# Patient Record
Sex: MALE | Race: WHITE | Employment: PART TIME | URBAN - METROPOLITAN AREA
[De-identification: names, ages, dates, MRNs, and addresses within clinical notes are randomized per-mention and may not be internally consistent; named-entity substitution may affect disease eponyms.]

---

## 2024-10-14 ENCOUNTER — PREP FOR PROCEDURE (OUTPATIENT)
Dept: GASTROENTEROLOGY | Age: 49
End: 2024-10-14

## 2024-10-14 ENCOUNTER — ANESTHESIA (OUTPATIENT)
Dept: ENDOSCOPY | Age: 49
End: 2024-10-14

## 2024-10-14 ENCOUNTER — ANESTHESIA EVENT (OUTPATIENT)
Dept: ENDOSCOPY | Age: 49
End: 2024-10-14

## 2024-10-14 PROCEDURE — 2500000003 HC RX 250 WO HCPCS

## 2024-10-14 PROCEDURE — 6360000002 HC RX W HCPCS

## 2024-10-14 RX ORDER — SODIUM CHLORIDE 9 MG/ML
INJECTION, SOLUTION INTRAVENOUS CONTINUOUS
Status: CANCELLED | OUTPATIENT
Start: 2024-10-14

## 2024-10-14 RX ORDER — GLYCOPYRROLATE 1 MG/5 ML
SYRINGE (ML) INTRAVENOUS
Status: DISCONTINUED | OUTPATIENT
Start: 2024-10-14 | End: 2024-10-14 | Stop reason: SDUPTHER

## 2024-10-14 RX ORDER — SODIUM CHLORIDE 0.9 % (FLUSH) 0.9 %
5-40 SYRINGE (ML) INJECTION EVERY 12 HOURS SCHEDULED
Status: CANCELLED | OUTPATIENT
Start: 2024-10-14

## 2024-10-14 RX ORDER — ONDANSETRON 2 MG/ML
INJECTION INTRAMUSCULAR; INTRAVENOUS
Status: DISCONTINUED | OUTPATIENT
Start: 2024-10-14 | End: 2024-10-14 | Stop reason: SDUPTHER

## 2024-10-14 RX ORDER — SODIUM CHLORIDE 0.9 % (FLUSH) 0.9 %
5-40 SYRINGE (ML) INJECTION PRN
Status: CANCELLED | OUTPATIENT
Start: 2024-10-14

## 2024-10-14 RX ORDER — ROCURONIUM BROMIDE 10 MG/ML
INJECTION, SOLUTION INTRAVENOUS
Status: DISCONTINUED | OUTPATIENT
Start: 2024-10-14 | End: 2024-10-14 | Stop reason: SDUPTHER

## 2024-10-14 RX ORDER — INDOMETHACIN 100 MG
100 SUPPOSITORY, RECTAL RECTAL ONCE
Status: CANCELLED | OUTPATIENT
Start: 2024-10-14 | End: 2024-10-14

## 2024-10-14 RX ORDER — SODIUM CHLORIDE 9 MG/ML
INJECTION, SOLUTION INTRAVENOUS PRN
Status: CANCELLED | OUTPATIENT
Start: 2024-10-14

## 2024-10-14 RX ORDER — PROPOFOL 10 MG/ML
INJECTION, EMULSION INTRAVENOUS
Status: DISCONTINUED | OUTPATIENT
Start: 2024-10-14 | End: 2024-10-14 | Stop reason: SDUPTHER

## 2024-10-14 RX ADMIN — ONDANSETRON 4 MG: 2 INJECTION INTRAMUSCULAR; INTRAVENOUS at 13:18

## 2024-10-14 RX ADMIN — ROCURONIUM BROMIDE 50 MG: 10 INJECTION, SOLUTION INTRAVENOUS at 13:12

## 2024-10-14 RX ADMIN — PROPOFOL 200 MG: 10 INJECTION, EMULSION INTRAVENOUS at 13:12

## 2024-10-14 RX ADMIN — Medication 0.2 MG: at 13:26

## 2024-10-14 RX ADMIN — SUGAMMADEX 200 MG: 100 INJECTION, SOLUTION INTRAVENOUS at 13:48

## 2024-10-14 NOTE — ANESTHESIA POSTPROCEDURE EVALUATION
Department of Anesthesiology  Postprocedure Note    Patient: Juan Luis Massey  MRN: [id number]  YOB: 1975  Date of evaluation: 10/14/2024    Procedure Summary       Date: 10/14/24 Room / Location: [facility] OR 02 / [facility]    Anesthesia Start: 1309 Anesthesia Stop: 1404    Procedure: ENDOSCOPIC RETROGRADE CHOLANGIOPANCREATOGRAPHY DIAGNOSTIC With Sphincterotomy and BiliaryDilation with Balloon sweep and  Biliary Advanix stent placement Diagnosis:       Choledocholithiasis      (Choledocholithiasis [K80.50])    Surgeons: James Kent MD Responsible Provider: Bashir Hearn APRN - CRNA    Anesthesia Type: General ASA Status: 3            Anesthesia Type: General    Papi Phase I: Papi Score: 10    Papi Phase II:      Anesthesia Post Evaluation    Patient location during evaluation: PACU  Patient participation: waiting for patient participation  Level of consciousness: sleepy but conscious  Pain score: 0  Airway patency: patent  Nausea & Vomiting: no nausea and no vomiting  Cardiovascular status: blood pressure returned to baseline and hemodynamically stable  Respiratory status: acceptable, nonlabored ventilation, spontaneous ventilation and face mask  Hydration status: euvolemic  Pain management: adequate        No notable events documented.

## 2024-10-14 NOTE — ANESTHESIA PRE PROCEDURE
Department of Anesthesiology  Preprocedure Note       Name:  Juan Luis Massey   Age:  49 y.o.  :  1975                                          MRN:  [id number]         Date:  10/14/2024      Surgeon: Surgeon(s):  James Kent MD    Procedure: Procedure(s):  ENDOSCOPIC RETROGRADE CHOLANGIOPANCREATOGRAPHY DIAGNOSTIC    Medications prior to admission:   Prior to Admission medications    Medication Sig Start Date End Date Taking? Authorizing Provider   risperiDONE (RISPERDAL) 2 MG tablet Take 1 tablet by mouth 2 times daily   Yes ProviderCharan MD   vitamin B-12 (CYANOCOBALAMIN) 500 MCG tablet Take 1 tablet by mouth daily   Yes Provider, MD Charan   lisinopril (PRINIVIL;ZESTRIL) 5 MG tablet Take 1 tablet by mouth daily   Yes ProviderCharan MD   buPROPion (WELLBUTRIN XL) 300 MG extended release tablet Take 1 tablet by mouth every morning   Yes Provider, MD Charan   aspirin 81 MG chewable tablet Take 1 tablet by mouth daily   Yes ProviderCharan MD   levothyroxine (SYNTHROID) 75 MCG tablet Take 1 tablet by mouth Daily   Yes Provider, MD Charan   rivaroxaban (XARELTO) 10 MG TABS tablet Take 2.5 mg by mouth 2 times daily   Yes Provider, MD Charan   atorvastatin (LIPITOR) 80 MG tablet Take 1 tablet by mouth daily   Yes ProviderCharan MD   tiZANidine (ZANAFLEX) 2 MG tablet Take 1 tablet by mouth every 6 hours as needed   Yes Provider, MD Charan   vitamin D (VITAMIN D3) 50 MCG (2000 UT) CAPS capsule Take 1 capsule by mouth daily   Yes ProviderCharan MD       Current medications:    Current Facility-Administered Medications   Medication Dose Route Frequency Provider Last Rate Last Admin    sodium chloride 0.9 % infusion             0.9 % sodium chloride infusion   IntraVENous Continuous James Kent MD 75 mL/hr at 10/14/24 1122 New Bag at 10/14/24 1122    sodium chloride flush 0.9 % injection 5-40 mL  5-40 mL IntraVENous 2 times per day James Kent MD

## 2024-12-05 RX ORDER — SODIUM CHLORIDE 0.9 % (FLUSH) 0.9 %
5-40 SYRINGE (ML) INJECTION PRN
Status: CANCELLED | OUTPATIENT
Start: 2024-12-05

## 2024-12-05 RX ORDER — SODIUM CHLORIDE 9 MG/ML
INJECTION, SOLUTION INTRAVENOUS CONTINUOUS
Status: CANCELLED | OUTPATIENT
Start: 2024-12-05

## 2024-12-05 RX ORDER — SODIUM CHLORIDE 9 MG/ML
INJECTION, SOLUTION INTRAVENOUS PRN
Status: CANCELLED | OUTPATIENT
Start: 2024-12-05

## 2024-12-05 RX ORDER — SODIUM CHLORIDE 0.9 % (FLUSH) 0.9 %
5-40 SYRINGE (ML) INJECTION EVERY 12 HOURS SCHEDULED
Status: CANCELLED | OUTPATIENT
Start: 2024-12-05

## 2024-12-05 RX ORDER — INDOMETHACIN 100 MG
100 SUPPOSITORY, RECTAL RECTAL ONCE
Status: CANCELLED | OUTPATIENT
Start: 2024-12-05 | End: 2024-12-05

## 2024-12-13 ENCOUNTER — TELEPHONE (OUTPATIENT)
Dept: GASTROENTEROLOGY | Age: 49
End: 2024-12-13

## 2024-12-13 NOTE — TELEPHONE ENCOUNTER
Spoke with Chelsea regarding Dr. Kent last message, they will call the office back next week to reschedule the procedure date.

## 2024-12-13 NOTE — TELEPHONE ENCOUNTER
The patient guardian called to see if the patient can wait until Feb to remove his stent, he is schedule now on 12/26/24 @10:00.   Chelsea Alexandra said Dr. Comer at [facility] wanted the patient to get a scan of his carotid artery before having his gallbladder out. Is this ok to wait until February. Please advise.

## 2024-12-17 ENCOUNTER — PREP FOR PROCEDURE (OUTPATIENT)
Dept: GASTROENTEROLOGY | Age: 49
End: 2024-12-17

## 2024-12-19 NOTE — TELEPHONE ENCOUNTER
Following up with Chelsea, she said she thought the patient had some blockage from the Carotid Artery test he had done at [facility] . The patient will have additional test done and they will call back if they need to reschedule his procedure on 12/26/24.

## 2024-12-20 NOTE — TELEPHONE ENCOUNTER
The patient guardian called  to reschedule the stent removal to 1/23/25@ 10:30, the patient will have an ultra sound done on the Carotid Artery at [facility] on 12/23/24. The patient Group [facility] was informed at [phone].

## 2024-12-25 RX ORDER — SODIUM CHLORIDE 0.9 % (FLUSH) 0.9 %
5-40 SYRINGE (ML) INJECTION EVERY 12 HOURS SCHEDULED
Status: CANCELLED | OUTPATIENT
Start: 2024-12-25

## 2024-12-25 RX ORDER — SODIUM CHLORIDE 9 MG/ML
INJECTION, SOLUTION INTRAVENOUS PRN
Status: CANCELLED | OUTPATIENT
Start: 2024-12-25

## 2024-12-25 RX ORDER — INDOMETHACIN 100 MG
100 SUPPOSITORY, RECTAL RECTAL ONCE
Status: CANCELLED | OUTPATIENT
Start: 2024-12-25 | End: 2024-12-25

## 2024-12-25 RX ORDER — SODIUM CHLORIDE 9 MG/ML
INJECTION, SOLUTION INTRAVENOUS CONTINUOUS
Status: CANCELLED | OUTPATIENT
Start: 2024-12-25

## 2024-12-25 RX ORDER — SODIUM CHLORIDE 0.9 % (FLUSH) 0.9 %
5-40 SYRINGE (ML) INJECTION PRN
Status: CANCELLED | OUTPATIENT
Start: 2024-12-25

## 2025-01-23 ENCOUNTER — ANESTHESIA (OUTPATIENT)
Dept: ENDOSCOPY | Age: 50
End: 2025-01-23
Payer: MEDICARE

## 2025-01-23 ENCOUNTER — APPOINTMENT (OUTPATIENT)
Dept: GENERAL RADIOLOGY | Age: 50
End: 2025-01-23
Attending: INTERNAL MEDICINE
Payer: MEDICARE

## 2025-01-23 ENCOUNTER — HOSPITAL ENCOUNTER (OUTPATIENT)
Age: 50
Setting detail: OUTPATIENT SURGERY
Discharge: HOME | End: 2025-01-23
Attending: INTERNAL MEDICINE | Admitting: INTERNAL MEDICINE
Payer: MEDICARE

## 2025-01-23 ENCOUNTER — ANESTHESIA EVENT (OUTPATIENT)
Dept: ENDOSCOPY | Age: 50
End: 2025-01-23
Payer: MEDICARE

## 2025-01-23 VITALS
WEIGHT: 250 LBS | OXYGEN SATURATION: 95 % | HEART RATE: 66 BPM | BODY MASS INDEX: 41.65 KG/M2 | TEMPERATURE: 98.1 F | RESPIRATION RATE: 16 BRPM | HEIGHT: 65 IN | DIASTOLIC BLOOD PRESSURE: 81 MMHG | SYSTOLIC BLOOD PRESSURE: 124 MMHG

## 2025-01-23 DIAGNOSIS — R52 PAIN: ICD-10-CM

## 2025-01-23 PROCEDURE — 74328 X-RAY BILE DUCT ENDOSCOPY: CPT | Performed by: INTERNAL MEDICINE

## 2025-01-23 PROCEDURE — 3700000000 HC ANESTHESIA ATTENDED CARE: Performed by: INTERNAL MEDICINE

## 2025-01-23 PROCEDURE — 7100000010 HC PHASE II RECOVERY - FIRST 15 MIN: Performed by: INTERNAL MEDICINE

## 2025-01-23 PROCEDURE — 7100000011 HC PHASE II RECOVERY - ADDTL 15 MIN: Performed by: INTERNAL MEDICINE

## 2025-01-23 PROCEDURE — 2709999900 HC NON-CHARGEABLE SUPPLY: Performed by: INTERNAL MEDICINE

## 2025-01-23 PROCEDURE — 74300 X-RAY BILE DUCTS/PANCREAS: CPT

## 2025-01-23 PROCEDURE — 43276 ERCP STENT EXCHANGE W/DILATE: CPT | Performed by: INTERNAL MEDICINE

## 2025-01-23 PROCEDURE — 6360000004 HC RX CONTRAST MEDICATION: Performed by: INTERNAL MEDICINE

## 2025-01-23 PROCEDURE — C1769 GUIDE WIRE: HCPCS | Performed by: INTERNAL MEDICINE

## 2025-01-23 PROCEDURE — 2720000010 HC SURG SUPPLY STERILE: Performed by: INTERNAL MEDICINE

## 2025-01-23 PROCEDURE — 6360000002 HC RX W HCPCS: Performed by: NURSE ANESTHETIST, CERTIFIED REGISTERED

## 2025-01-23 PROCEDURE — 3700000001 HC ADD 15 MINUTES (ANESTHESIA): Performed by: INTERNAL MEDICINE

## 2025-01-23 PROCEDURE — 3609015000 HC ERCP REMOVE FOREIGN BODY/STENT BILIARY/PANC DUCT: Performed by: INTERNAL MEDICINE

## 2025-01-23 PROCEDURE — C2625 STENT, NON-COR, TEM W/DEL SY: HCPCS | Performed by: INTERNAL MEDICINE

## 2025-01-23 PROCEDURE — 43264 ERCP REMOVE DUCT CALCULI: CPT | Performed by: INTERNAL MEDICINE

## 2025-01-23 DEVICE — BILIARY STENT WITH NAVIFLEXTM RX DELIVERY SYSTEM
Type: IMPLANTABLE DEVICE | Site: BILE DUCT | Status: FUNCTIONAL
Brand: ADVANIX™ BILIARY

## 2025-01-23 RX ORDER — SODIUM CHLORIDE 0.9 % (FLUSH) 0.9 %
5-40 SYRINGE (ML) INJECTION PRN
Status: DISCONTINUED | OUTPATIENT
Start: 2025-01-23 | End: 2025-01-23 | Stop reason: HOSPADM

## 2025-01-23 RX ORDER — SODIUM CHLORIDE 9 MG/ML
INJECTION, SOLUTION INTRAVENOUS CONTINUOUS
Status: DISCONTINUED | OUTPATIENT
Start: 2025-01-23 | End: 2025-01-23 | Stop reason: HOSPADM

## 2025-01-23 RX ORDER — IOPAMIDOL 612 MG/ML
INJECTION, SOLUTION INTRAVASCULAR PRN
Status: DISCONTINUED | OUTPATIENT
Start: 2025-01-23 | End: 2025-01-23 | Stop reason: ALTCHOICE

## 2025-01-23 RX ORDER — PROPOFOL 10 MG/ML
INJECTION, EMULSION INTRAVENOUS
Status: DISCONTINUED | OUTPATIENT
Start: 2025-01-23 | End: 2025-01-23 | Stop reason: SDUPTHER

## 2025-01-23 RX ORDER — SODIUM CHLORIDE 9 MG/ML
INJECTION, SOLUTION INTRAVENOUS PRN
Status: DISCONTINUED | OUTPATIENT
Start: 2025-01-23 | End: 2025-01-23 | Stop reason: HOSPADM

## 2025-01-23 RX ORDER — SODIUM CHLORIDE 0.9 % (FLUSH) 0.9 %
5-40 SYRINGE (ML) INJECTION EVERY 12 HOURS SCHEDULED
Status: DISCONTINUED | OUTPATIENT
Start: 2025-01-23 | End: 2025-01-23 | Stop reason: HOSPADM

## 2025-01-23 RX ORDER — INDOMETHACIN 100 MG
100 SUPPOSITORY, RECTAL RECTAL ONCE
Status: DISCONTINUED | OUTPATIENT
Start: 2025-01-23 | End: 2025-01-23 | Stop reason: HOSPADM

## 2025-01-23 RX ADMIN — PROPOFOL 100 MG: 10 INJECTION, EMULSION INTRAVENOUS at 12:55

## 2025-01-23 RX ADMIN — PROPOFOL 50 MG: 10 INJECTION, EMULSION INTRAVENOUS at 12:41

## 2025-01-23 RX ADMIN — PROPOFOL 50 MG: 10 INJECTION, EMULSION INTRAVENOUS at 12:43

## 2025-01-23 RX ADMIN — PROPOFOL 50 MG: 10 INJECTION, EMULSION INTRAVENOUS at 12:48

## 2025-01-23 RX ADMIN — PROPOFOL 50 MG: 10 INJECTION, EMULSION INTRAVENOUS at 12:49

## 2025-01-23 ASSESSMENT — PAIN - FUNCTIONAL ASSESSMENT
PAIN_FUNCTIONAL_ASSESSMENT: NONE - DENIES PAIN
PAIN_FUNCTIONAL_ASSESSMENT: 0-10

## 2025-01-23 NOTE — ANESTHESIA POSTPROCEDURE EVALUATION
Department of Anesthesiology  Postprocedure Note    Patient: Juan Luis Massey  MRN: [id number]  YOB: 1975  Date of evaluation: 1/23/2025    Procedure Summary       Date: 01/23/25 Room / [facility]: [facility] OR 02 [facility]    Anesthesia Start: 1229 Anesthesia Stop:     Procedure: ENDOSCOPIC RETROGRADE CHOLANGIOPANCREATOGRAPHY STENT REMOVAL/EXCHANGE Diagnosis:       Encounter for removal of biliary stent      (Encounter for removal of biliary stent [Z46.89])    Surgeons: James Kent MD Responsible Provider: Yehuda David APRN - CRNA    Anesthesia Type: MAC ASA Status: 3            Anesthesia Type: No value filed.    Papi Phase I: Papi Score: 10    Papi Phase II:      Anesthesia Post Evaluation    Patient location during evaluation: bedside  Patient participation: complete - patient participated  Level of consciousness: awake and awake and alert  Airway patency: patent  Nausea & Vomiting: no nausea and no vomiting  Cardiovascular status: blood pressure returned to baseline and hemodynamically stable  Respiratory status: acceptable  Hydration status: euvolemic  Pain management: adequate        No notable events documented.
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
## 2025-01-23 NOTE — H&P
Patient Name: Juan Luis Massey  : 1975  MRN: [id number]  DATE: 25      ENDOSCOPY  History and Physical    Procedure:    [] Diagnostic Colonoscopy       [] Screening Colonoscopy  [] EGD      [x] ERCP      [] EUS       [] Other    [x] Previous office notes/History and Physical reviewed from the patients chart. Please see EMR for further details of HPI. I have examined the patient's status immediately prior to the procedure and:      Indications/HPI:    []Abdominal Pain   []Cancer- GI/Lung  []Fhx of colon CA  []History of Polyps   []Harry’s   []Melena  []Abnormal Imaging   []Dysphagia    []Persistent Pneumonia  []Anemia   []Food Impaction  []History of Polyps  []GI Bleed   []Pulmonary nodule/Mass  []Change in bowel habits  []Heartburn/Reflux  []Rectal Bleed (BRBPR)  []Chest Pain - Non Cardiac  []Heme (+) Stool  []Ulcers  []Constipation   []Hemoptysis   []Varices  []Diarrhea   []Hypoxemia  []Nausea/Vomiting   []Screening   []Crohns/Colitis  [x]Other: Stent exchange    Anesthesia:   [x] MAC [] Moderate Sedation   [] General   [] None     ROS: 12 pt Review of Symptoms was negative unless mentioned above    Medications:   Prior to Admission medications    Medication Sig Start Date End Date Taking? Authorizing Provider   omeprazole (PRILOSEC) 20 MG delayed release capsule Take 1 capsule by mouth daily   Yes ProviderCharan MD   risperiDONE (RISPERDAL) 2 MG tablet Take 1 tablet by mouth 2 times daily   Yes Provider, Historical, MD   vitamin B-12 (CYANOCOBALAMIN) 500 MCG tablet Take 1 tablet by mouth daily   Yes Provider, MD Charan   lisinopril (PRINIVIL;ZESTRIL) 5 MG tablet Take 1 tablet by mouth daily   Yes Provider, Historical, MD   buPROPion (WELLBUTRIN XL) 300 MG extended release tablet Take 1 tablet by mouth every morning   Yes ProviderCharan MD   levothyroxine (SYNTHROID) 75 MCG tablet Take 1 tablet by mouth Daily   Yes ProviderCharan MD   atorvastatin (LIPITOR) 80 MG tablet Take 1

## 2025-01-23 NOTE — PROGRESS NOTES
Call placed to Dr. Skaggs regarding stent replacement, Dr. Kent states patient does not need to wait until stent is removed to have gallbladder removed. Dr. Skaggs verbalized understanding, states to have patient call office and get on the schedule. Patient and  made aware of conversation. Appt made for repeat ERCP.

## 2025-03-24 ENCOUNTER — TELEPHONE (OUTPATIENT)
Dept: SURGICAL ONCOLOGY | Facility: HOSPITAL | Age: 50
End: 2025-03-24
Payer: COMMERCIAL

## 2025-03-24 NOTE — TELEPHONE ENCOUNTER
Spoke to  regarding referral sent for patient. He currently resides at [facility]. They were uncertain about referral. They will call Dr. Marin office for clarification on the need to see us. Provided contact information.

## 2025-03-28 ENCOUNTER — PREP FOR PROCEDURE (OUTPATIENT)
Dept: GASTROENTEROLOGY | Age: 50
End: 2025-03-28

## 2025-03-31 RX ORDER — INDOMETHACIN 100 MG
100 SUPPOSITORY, RECTAL RECTAL ONCE
Status: CANCELLED | OUTPATIENT
Start: 2025-03-31 | End: 2025-03-31

## 2025-03-31 RX ORDER — SODIUM CHLORIDE 9 MG/ML
INJECTION, SOLUTION INTRAVENOUS PRN
Status: CANCELLED | OUTPATIENT
Start: 2025-03-31

## 2025-03-31 RX ORDER — SODIUM CHLORIDE 9 MG/ML
INJECTION, SOLUTION INTRAVENOUS CONTINUOUS
Status: CANCELLED | OUTPATIENT
Start: 2025-03-31

## 2025-03-31 RX ORDER — SODIUM CHLORIDE 0.9 % (FLUSH) 0.9 %
5-40 SYRINGE (ML) INJECTION PRN
Status: CANCELLED | OUTPATIENT
Start: 2025-03-31

## 2025-03-31 RX ORDER — SODIUM CHLORIDE 0.9 % (FLUSH) 0.9 %
5-40 SYRINGE (ML) INJECTION EVERY 12 HOURS SCHEDULED
Status: CANCELLED | OUTPATIENT
Start: 2025-03-31

## 2025-04-02 ASSESSMENT — ENCOUNTER SYMPTOMS
NAUSEA: 0
VOMITING: 0
DYSURIA: 0
HALLUCINATIONS: 0
SHORTNESS OF BREATH: 0
WEAKNESS: 0
FLANK PAIN: 0
SORE THROAT: 0
EYE DISCHARGE: 0
HEADACHES: 0
ENDOCRINE NEGATIVE: 1
SPEECH DIFFICULTY: 0
ADENOPATHY: 0
ABDOMINAL PAIN: 0
CONSTITUTIONAL NEGATIVE: 1
CONFUSION: 0

## 2025-04-03 ENCOUNTER — OFFICE VISIT (OUTPATIENT)
Dept: SURGICAL ONCOLOGY | Facility: CLINIC | Age: 50
End: 2025-04-03
Payer: COMMERCIAL

## 2025-04-03 VITALS
RESPIRATION RATE: 16 BRPM | OXYGEN SATURATION: 96 % | HEART RATE: 69 BPM | TEMPERATURE: 98.1 F | DIASTOLIC BLOOD PRESSURE: 82 MMHG | WEIGHT: 249.12 LBS | SYSTOLIC BLOOD PRESSURE: 122 MMHG

## 2025-04-03 DIAGNOSIS — K81.1 CHRONIC CHOLECYSTITIS: Primary | ICD-10-CM

## 2025-04-03 PROCEDURE — 99215 OFFICE O/P EST HI 40 MIN: CPT | Performed by: SURGERY

## 2025-04-03 PROCEDURE — 99205 OFFICE O/P NEW HI 60 MIN: CPT | Performed by: SURGERY

## 2025-04-03 RX ORDER — BUPROPION HYDROCHLORIDE 300 MG/1
1 TABLET ORAL
COMMUNITY

## 2025-04-03 RX ORDER — LISINOPRIL 5 MG/1
5 TABLET ORAL
COMMUNITY

## 2025-04-03 RX ORDER — RISPERIDONE 2 MG/1
1 TABLET ORAL 2 TIMES DAILY
COMMUNITY

## 2025-04-03 RX ORDER — DULOXETIN HYDROCHLORIDE 60 MG/1
60 CAPSULE, DELAYED RELEASE ORAL
COMMUNITY

## 2025-04-03 RX ORDER — LEVOTHYROXINE SODIUM 75 UG/1
1 TABLET ORAL DAILY
COMMUNITY

## 2025-04-03 RX ORDER — VIT C/E/ZN/COPPR/LUTEIN/ZEAXAN 250MG-90MG
500 CAPSULE ORAL
COMMUNITY
Start: 2025-02-12

## 2025-04-03 RX ORDER — ASPIRIN 81 MG/1
81 TABLET ORAL
COMMUNITY
Start: 2025-02-12

## 2025-04-03 RX ORDER — ATORVASTATIN CALCIUM 80 MG/1
1 TABLET, FILM COATED ORAL DAILY
COMMUNITY

## 2025-04-03 RX ORDER — ACETAMINOPHEN 500 MG
2000 TABLET ORAL
COMMUNITY

## 2025-04-03 RX ORDER — VORTIOXETINE 10 MG/1
1 TABLET, FILM COATED ORAL DAILY
COMMUNITY

## 2025-04-03 RX ORDER — HEPARIN SODIUM 5000 [USP'U]/ML
5000 INJECTION, SOLUTION INTRAVENOUS; SUBCUTANEOUS ONCE
OUTPATIENT
Start: 2025-04-03 | End: 2025-04-03

## 2025-04-03 RX ORDER — OMEPRAZOLE 20 MG/1
20 CAPSULE, DELAYED RELEASE ORAL
COMMUNITY

## 2025-04-03 RX ORDER — OXCARBAZEPINE 300 MG/1
TABLET, FILM COATED ORAL
COMMUNITY
Start: 2025-03-13

## 2025-04-03 ASSESSMENT — PAIN SCALES - GENERAL: PAINLEVEL_OUTOF10: 0-NO PAIN

## 2025-04-03 NOTE — PROGRESS NOTES
.Subjective     HPI  Erick Martinez is a 49 y.o. male who is referred by Dr Marin for chronic cholecystitis.  He has history of cholelithiasis and choledocholithiasis treated with ERCP and stent placement.  Of note, he is on antiplatelet therapy and Xarelto due to very high risk of stroke.  He was taken to the operating room for an attempted laparoscopic cholecystectomy.  Due to severe adhesions, this was not completed, instead cholangiogram was performed through the gallbladder and a drain was placed which has subsequently been removed.  He is referred to me for cholecystectomy.    He takes Xarelto and baby aspirin in addition to other medications as listed.    Review of Systems   Constitutional: Negative.    HENT:  Negative for ear discharge, nosebleeds and sore throat.    Eyes:  Negative for discharge.   Respiratory:  Negative for shortness of breath.    Cardiovascular:  Negative for chest pain.   Gastrointestinal:  Negative for abdominal pain, nausea and vomiting.   Endocrine: Negative.    Genitourinary:  Negative for dysuria and flank pain.   Musculoskeletal:  Negative for gait problem.   Skin:  Negative for rash.   Neurological:  Negative for speech difficulty, weakness and headaches.   Hematological:  Negative for adenopathy.   Psychiatric/Behavioral:  Negative for behavioral problems, confusion and hallucinations.           MEDICATIONS: ALLERGIES        Current Outpatient Medications   Medication Instructions    aspirin (ASPIRIN LOW DOSE) 81 mg, Oral, Daily    atorvastatin (LIPITOR) 80 mg, Daily    buPROPion XL (WELLBUTRIN XL) 300 mg, Every 24 hours    cholecalciferol (Vitamin D-3) 50 MCG (2000 UT) capsule 1 capsule, Every 24 hours    cyanocobalamin (VITAMIN B-12) 500 mcg, Oral, Daily    DULoxetine (Cymbalta) 60 MG DR capsule 1 capsule, Every 24 hours    HYDROcodone-acetaminophen (Norco) 5-325 MG tablet 1 tablet, Oral, Every 6 hours PRN    levothyroxine (Synthroid, Levoxyl) 150 MCG tablet      lisinopril 5  mg, Daily    omeprazole (PRILOSEC) 20 mg, Daily RT    OXcarbazepine (Trileptal) 300 MG tablet TAKE 1 & 1/2 TABLETS BY MOUTH EVERY NIGHT AT BEDTIME    risperiDONE (RISPERDAL) 2 mg, Daily    rivaroxaban (XARELTO) 2.5 mg, Oral, 2 times daily    tiZANidine (ZANAFLEX) 4 mg, Oral, Nightly PRN    Trintellix 10 MG tablet Every 24 hours    No Known Allergies         PAST MEDICAL HISTORY: SOCIAL HISTORY SURGICAL HISTORY:        Past Medical History:   Diagnosis Date    Hypercholesterolemia (CMS/[facility])      Insomnia      Migraines (CMS/[facility])      Obstructive sleep apnea      Stroke (CMS/[facility])      TIA (transient ischemic attack)      Vascular dementia (CMS/[facility])      Social History            Tobacco Use    Smoking status: Every Day       Types: Cigarettes    Smokeless tobacco: Never   Substance Use Topics    Alcohol use: Yes                 Past Surgical History:   Procedure Laterality Date    APPENDECTOMY        ERCP        MR ANGIOGRAM NECK WO IV CONTRAST   07/07/2011     MR ANGIOGRAM NECK WO IV CONTRAST    SKIN GRAFT             Social History     Socioeconomic History    Marital status: Single     Spouse name: Not on file    Number of children: Not on file    Years of education: Not on file    Highest education level: Not on file   Occupational History    Not on file   Tobacco Use    Smoking status: Not on file    Smokeless tobacco: Not on file   Substance and Sexual Activity    Alcohol use: Not on file    Drug use: Not on file    Sexual activity: Not on file   Other Topics Concern    Not on file   Social History Narrative    Not on file     Social Drivers of Health     Financial Resource Strain: Not on file   Food Insecurity: Not on file   Transportation Needs: Not on file   Physical Activity: Not on file   Stress: Not on file   Social Connections: Not on file   Intimate Partner Violence: Not on file   Housing Stability: Not on file      No family history on file.       Objective     Vitals:    04/03/25 1038   BP: 122/82    Pulse: 69   Resp: 16   Temp: 36.7 °C (98.1 °F)   SpO2: 96%          Physical Exam  Constitutional:       Appearance: Normal appearance.   HENT:      Head: Atraumatic.      Nose: Nose normal.   Eyes:      Extraocular Movements: Extraocular movements intact.      Conjunctiva/sclera: Conjunctivae normal.   Cardiovascular:      Rate and Rhythm: Normal rate.   Pulmonary:      Effort: Pulmonary effort is normal.   Abdominal:      Palpations: Abdomen is soft.      Tenderness: There is no abdominal tenderness.   Musculoskeletal:         General: Normal range of motion.   Skin:     Findings: No rash.   Neurological:      General: No focal deficit present.      Mental Status: He is alert.   Psychiatric:         Behavior: Behavior normal.       Assessment/Plan     49-year-old man with chronic cholecystitis who also is chronically anticoagulated for high risk of stroke.  Recommended cholecystectomy.  I think would be reasonable to attempt this using a robotic approach and discussed the very high risk of needing to convert to an open procedure. He will continue aspirin until day of surgery and stop xarelto for 72hrs before surgery. Surgery on 5/5/2025 at [facility]. PATs ordered    Karthik Matthews MD

## 2025-04-10 ENCOUNTER — CLINICAL SUPPORT (OUTPATIENT)
Dept: PREADMISSION TESTING | Facility: HOSPITAL | Age: 50
End: 2025-04-10
Payer: COMMERCIAL

## 2025-04-10 NOTE — CPM/PAT H&P
CPM/PAT Evaluation       Name: Erick Martinez (Erick Martinez)  /Age: 1975/49 y.o.     { PAT Visit Type:98299}      Chief Complaint: ***    HPI  Erick Martinez is scheduled for Cholecystectomy Robot-Assisted, POSSIBLE OPEN with Dr. Matthews on 25.  Past Medical History:   Diagnosis Date    Asthma     Calculus of common bile duct and gallbladder with chronic cholecystitis     Choledocholithiasis     Chronic anticoagulation     Coronary artery disease     Hypercholesterolemia     Hypertension     Migraines     On Trileptal    Occlusion and stenosis of left carotid artery     Sleep apnea     not using cpap    Stroke (Multi)     on Xarelto and ASA    TIA (transient ischemic attack)     Vascular dementia        Past Surgical History:   Procedure Laterality Date    APPENDECTOMY      CAROTID STENT      ERCP W/ PLASTIC STENT PLACEMENT  2025       Patient  has no history on file for sexual activity.    No family history on file.    No Known Allergies    Prior to Admission medications    Medication Sig Start Date End Date Taking? Authorizing Provider   aspirin 81 mg EC tablet Take 1 tablet (81 mg) by mouth once daily. 25   Historical Provider, MD   atorvastatin (Lipitor) 80 mg tablet Take 1 tablet (80 mg) by mouth once daily.    Historical Provider, MD   buPROPion XL (Wellbutrin XL) 300 mg 24 hr tablet Take 1 tablet (300 mg) by mouth once daily in the morning. Take before meals.    Historical Provider, MD   cholecalciferol (Vitamin D-3) 50 mcg (2,000 unit) capsule Take 1 capsule (50 mcg) by mouth once daily.    Historical Provider, MD   cyanocobalamin (Vitamin B-12) 500 mcg tablet Take 1 tablet (500 mcg) by mouth once daily. 25   Historical Provider, MD   DOCOSAHEXAENOIC ACID ORAL Take 1,000 mg by mouth once daily.    Historical Provider, MD   DULoxetine (Cymbalta) 60 mg DR capsule Take 1 capsule (60 mg) by mouth once daily.    Historical Provider, MD   levothyroxine (Synthroid, Levoxyl) 75  mcg tablet Take 1 tablet (75 mcg) by mouth once daily.    Historical Provider, MD   lisinopril 5 mg tablet Take 1 tablet (5 mg) by mouth once daily.    Historical Provider, MD   omeprazole (PriLOSEC) 20 mg DR capsule Take 1 capsule (20 mg) by mouth once daily.    Historical Provider, MD   OXcarbazepine (Trileptal) 300 mg tablet TAKE 1 & 1/2 TABLETS BY MOUTH EVERY NIGHT AT BEDTIME 3/13/25   Historical Provider, MD   risperiDONE (RisperDAL) 2 mg tablet Take 1 tablet (2 mg) by mouth 2 times a day.    Historical Provider, MD   rivaroxaban (Xarelto) 10 mg tablet Take 2.5 mg by mouth 2 times a day.    Historical Provider, MD   Trintellix 10 mg tablet tablet Take 1 tablet (10 mg) by mouth once daily.    Historical Provider, MD ALVAREZ ROS     PAT Physical Exam     Airway    Testing/Diagnostic:   XR cholangiogram; 3/20/25  Impression:    Intraoperative study     FL CHOLANGIOGRAM OR; 1/23/25  Impression:  Fluoroscopic support provided to subspecialty service.  Please see   subspecialty report for full details and interpretation of real time imaging.      US Carotid Duplex Bilateral; 12/17/24  IMPRESSION:  PROBABLY CHRONIC OCCLUSION OF BOTH CERVICAL INTERNAL CAROTID ARTERIES.      Transthoracic Echo; 4/24/23        Patient Specialist/PCP:   PCP: Savage Barajas MD at [facility] P: [phone]- Requested last office note      Surgical Oncology: Karthik Matthews MD referred by Dr Marin for chronic cholecystitis who also is chronically anticoagulated for high risk of stroke.  Recommended cholecystectomy.  I think would be reasonable to attempt this using a robotic approach and discussed the very high risk of needing to convert to an open procedure. He will continue aspirin until day of surgery and stop xarelto for 72hrs before surgery. Surgery on 5/5/2025 at [facility].        General Surgery: Dav BECKMAN MD at [facility] 4/1/25 The patient is status post attempted laparoscopic cholecystectomy. This could not be  performed because of anatomy. A cholangiogram was obtained through a needle in the gallbladder. A drain was then placed.     Neurology: RAJAN Alejandro 12/10/24 Patient is here today for follow-up of stroke, headaches. I am following the plan of care established by Dr Perkins who is present in the office today.   recurrent stroke and TIA with multiple hospital visits and chronic symptoms of bilateral UE numbness intermittently. He has a left ICA occlusion. Right arm hemiparesis. He has WILBERT stenosis however graded less than 50% stenosis. He continues to follow up with PCP for stroke risk factors. MRI of the brain revealed remote watershed infarcts with possible findings of moyamoya disease.     Plan:  1. I discussed at length with the patient the increased risk of stroke when going off antiplatelet medication including aspirin for any reason including for a procedure. The increased risk of stroke should be weighed against the need for the procedure both by the patient and his/her treating physician.  Although I believe the patient's overall risk for cerebral ischemia is moderate.  2. I spent greater than 50% of the minute visit counseling and coordination of care regarding the risk involved in stopping antiplatelet medication including aspirin for a specific procedure including the potentially high morbidity and mortality associated with stroke. I answered all questions. The patient states understanding.  3. The request for clearance by the treating physician in no way translates into a definitive prediction of possible stroke but rather serves as a discussion of the overall risk involved with the decision to stop antiplatelet medication for stroke prevention which is taken the patient and physician performing the procedure.  4. We will update carotid ultrasound to help assess his risk prior to surgery. We will send this to [facility].  5. Resume Xarelto and aspirin as soon as able from a surgery perspective.   6.  Continue Trileptal 300mg 1 1/2 tabs PO QHS for headache prevention.  7. Continue Zanaflex 4mg 1/2-1 tab PO QHS for muscle spasms/headache prevention.  8. Continue following PCP for stroke risk factor management  9. I counseled the patient on stroke signs and symptoms and advised the patient to go immediately to the emergency room should these symptoms develop. The patient states understanding.   10. I counseled the patient on fall precautions. I discussed the high risk of trauma and debility associated with falls. Patient verbalized understanding.        Vascular: Madi Abrams MD  There were no vitals taken for this visit.    DASI Risk Score    No data to display       Caprini DVT Assessment    No data to display       Modified Frailty Index    No data to display       [id number] Stroke Risk Points  Current as of just now        N/A 0 to 9 Points:      Last Change: N/A          The [id number] risk score (Lip ANISA, et al. 2009. © 2010 [employer]) quantifies the risk of stroke for a patient with atrial fibrillation. For patients without atrial fibrillation or under the age of 18 this score appears as N/A. Higher score values generally indicate higher risk of stroke.        This score is not applicable to this patient. Components are not calculated.          Revised Cardiac Risk Index    No data to display       Apfel Simplified Score    No data to display       Risk Analysis Index Results This Encounter    No data found in the last 10 encounters.       Prodigy: High Risk  Total Score: 8              Prodigy Gender Score          ARISCAT Score for Postoperative Pulmonary Complications    No data to display       Moss Perioperative Risk for Myocardial Infarction or Cardiac Arrest (SALLIE)    No data to display         Assessment and Plan:    ONLY    Mayra Brunner RN  Pre-Admission Testing   {[facility] EMBEDDED ASSESSMENT AND PLAN:65431}

## 2025-04-22 ENCOUNTER — PRE-ADMISSION TESTING (OUTPATIENT)
Dept: PREADMISSION TESTING | Facility: HOSPITAL | Age: 50
End: 2025-04-22
Payer: COMMERCIAL

## 2025-04-22 VITALS
OXYGEN SATURATION: 98 % | HEIGHT: 66 IN | HEART RATE: 69 BPM | TEMPERATURE: 98 F | WEIGHT: 252.7 LBS | SYSTOLIC BLOOD PRESSURE: 128 MMHG | BODY MASS INDEX: 40.61 KG/M2 | DIASTOLIC BLOOD PRESSURE: 85 MMHG

## 2025-04-22 DIAGNOSIS — E03.9 HYPOTHYROIDISM, UNSPECIFIED TYPE: ICD-10-CM

## 2025-04-22 DIAGNOSIS — K81.1 CHRONIC CHOLECYSTITIS: ICD-10-CM

## 2025-04-22 DIAGNOSIS — I10 HYPERTENSION, UNSPECIFIED TYPE: ICD-10-CM

## 2025-04-22 DIAGNOSIS — Z01.818 PREOPERATIVE EXAMINATION: Primary | ICD-10-CM

## 2025-04-22 DIAGNOSIS — F17.200 TOBACCO USE DISORDER: ICD-10-CM

## 2025-04-22 LAB
ABO GROUP (TYPE) IN BLOOD: NORMAL
ANTIBODY SCREEN: NORMAL
RH FACTOR (ANTIGEN D): NORMAL
TSH SERPL-ACNC: 2.9 MIU/L (ref 0.44–3.98)

## 2025-04-22 PROCEDURE — 36415 COLL VENOUS BLD VENIPUNCTURE: CPT

## 2025-04-22 PROCEDURE — 99204 OFFICE O/P NEW MOD 45 MIN: CPT

## 2025-04-22 PROCEDURE — 93005 ELECTROCARDIOGRAM TRACING: CPT

## 2025-04-22 PROCEDURE — 99406 BEHAV CHNG SMOKING 3-10 MIN: CPT

## 2025-04-22 PROCEDURE — 84443 ASSAY THYROID STIM HORMONE: CPT

## 2025-04-22 PROCEDURE — 86850 RBC ANTIBODY SCREEN: CPT

## 2025-04-22 RX ORDER — CHLORHEXIDINE GLUCONATE ORAL RINSE 1.2 MG/ML
15 SOLUTION DENTAL AS NEEDED
Qty: 120 ML | Refills: 0 | Status: SHIPPED | OUTPATIENT
Start: 2025-04-22 | End: 2025-04-24

## 2025-04-22 RX ORDER — CHLORHEXIDINE GLUCONATE 40 MG/ML
SOLUTION TOPICAL DAILY PRN
Qty: 473 ML | Refills: 0 | Status: SHIPPED | OUTPATIENT
Start: 2025-04-22

## 2025-04-22 ASSESSMENT — ENCOUNTER SYMPTOMS
DYSPNEA AT REST: 0
PALPITATIONS: 0
MYALGIAS: 0
CHILLS: 0
CONSTIPATION: 0
DIARRHEA: 0
POLYDIPSIA: 0
EYE DISCHARGE: 0
HEMOPTYSIS: 0
VERTIGO: 0
DYSPNEA WITH EXERTION: 0
LIMITED RANGE OF MOTION: 0
SKIN CHANGES: 0
DYSURIA: 0
NAUSEA: 0
ABDOMINAL PAIN: 1
NUMBNESS: 0
WHEEZING: 0
SHORTNESS OF BREATH: 0
NECK PAIN: 0
ABDOMINAL DISTENTION: 0
BRUISES/BLEEDS EASILY: 0
WOUND: 0
SINUS CONGESTION: 0
TREMORS: 0
BLOOD IN STOOL: 0
LIGHT-HEADEDNESS: 0
FEVER: 0
JOINT SWELLING: 0
RHINORRHEA: 0
EXCESSIVE BLEEDING: 0
VOMITING: 0
VISUAL CHANGE: 0
NECK SWELLING: 0
ARTHRALGIAS: 0
PND: 0
COUGH: 0
DOUBLE VISION: 0
NECK MASS: 0
CONFUSION: 0
UNEXPECTED WEIGHT CHANGE: 0
NERVOUS/ANXIOUS: 0
WEAKNESS: 0
VOICE CHANGE: 0
NECK STIFFNESS: 0
TROUBLE SWALLOWING: 0
DIFFICULTY URINATING: 0

## 2025-04-22 ASSESSMENT — DUKE ACTIVITY SCORE INDEX (DASI)
CAN YOU DO MODERATE WORK AROUND THE HOUSE LIKE VACUUMING, SWEEPING FLOORS OR CARRYING GROCERIES: YES
CAN YOU DO LIGHT WORK AROUND THE HOUSE LIKE DUSTING OR WASHING DISHES: YES
CAN YOU RUN A SHORT DISTANCE: YES
CAN YOU TAKE CARE OF YOURSELF (EAT, DRESS, BATHE, OR USE TOILET): YES
CAN YOU WALK INDOORS, SUCH AS AROUND YOUR HOUSE: YES
CAN YOU PARTICIPATE IN STRENOUS SPORTS LIKE SWIMMING, SINGLES TENNIS, FOOTBALL, BASKETBALL, OR SKIING: YES
TOTAL_SCORE: 58.2
CAN YOU CLIMB A FLIGHT OF STAIRS OR WALK UP A HILL: YES
CAN YOU HAVE SEXUAL RELATIONS: YES
CAN YOU WALK A BLOCK OR TWO ON LEVEL GROUND: YES
CAN YOU DO YARD WORK LIKE RAKING LEAVES, WEEDING OR PUSHING A MOWER: YES
DASI METS SCORE: 9.9
CAN YOU DO HEAVY WORK AROUND THE HOUSE LIKE SCRUBBING FLOORS OR LIFTING AND MOVING HEAVY FURNITURE: YES
CAN YOU PARTICIPATE IN MODERATE RECREATIONAL ACTIVITIES LIKE GOLF, BOWLING, DANCING, DOUBLES TENNIS OR THROWING A BASEBALL OR FOOTBALL: YES

## 2025-04-22 ASSESSMENT — PAIN SCALES - GENERAL: PAINLEVEL_OUTOF10: 6

## 2025-04-22 ASSESSMENT — LIFESTYLE VARIABLES: SMOKING_STATUS: NONSMOKER

## 2025-04-22 ASSESSMENT — PAIN - FUNCTIONAL ASSESSMENT: PAIN_FUNCTIONAL_ASSESSMENT: 0-10

## 2025-04-22 NOTE — CPM/PAT H&P
CPM/PAT Evaluation       Name: Erick Martinez (Erick Martinez)  /Age: 1975/49 y.o.     Visit Type:   In-Person       Chief Complaint: perioperative evaluation    HPI HPI: 48 y/o male scheduled for Cholecystectomy Robot-Assisted, POSSIBLE OPEN  on 2025  secondary to Chronic cholecystitis  with Dr. Karthik Matthews  who referred to CPM.  Presents to [facility] today for perioperative risk stratification and optimization. PMHX includes CVA (4 years prior), depression, vascular dementia, TIA (reports 2-3 years prior), migraines, intellectual disability, CAD, HLD, HTN, Carotid Stenosis (s/p stenting 2009 left ICA), XI (no CPAP), Asthma, Tobacco Use Disorder, Hypothyroidism, Chronic cholecystitis, OA, neuropathy.    PCP: Savage Barajas MD at [facility] P: [phone] in media  Specialists:   Oncology - Karthik Matthews MD   Gen Surgery - Dav BECKMAN MD   Neurology - RAJAN Alejandro   Vascular - Torrey Cruz MD            Medical History[1]    Surgical History[2]    Patient  has no history on file for sexual activity.    Family History[3]    Allergies[4]    Prior to Admission medications    Medication Sig Start Date End Date Taking? Authorizing Provider   aspirin 81 mg EC tablet Take 1 tablet (81 mg) by mouth once daily. 25   Historical Provider, MD   atorvastatin (Lipitor) 80 mg tablet Take 1 tablet (80 mg) by mouth once daily.    Historical Provider, MD   buPROPion XL (Wellbutrin XL) 300 mg 24 hr tablet Take 1 tablet (300 mg) by mouth once daily in the morning. Take before meals.    Historical Provider, MD   cholecalciferol (Vitamin D-3) 50 mcg (2,000 unit) capsule Take 1 capsule (50 mcg) by mouth once daily.    Historical Provider, MD   cyanocobalamin (Vitamin B-12) 500 mcg tablet Take 1 tablet (500 mcg) by mouth once daily. 25   Historical Provider, MD   DOCOSAHEXAENOIC ACID ORAL Take 1,000 mg by mouth once daily.    Historical Provider, MD   DULoxetine (Cymbalta) 60 mg   capsule Take 1 capsule (60 mg) by mouth once daily.    Historical Provider, MD   levothyroxine (Synthroid, Levoxyl) 75 mcg tablet Take 1 tablet (75 mcg) by mouth once daily.    Historical Provider, MD   lisinopril 5 mg tablet Take 1 tablet (5 mg) by mouth once daily.    Historical Provider, MD   omeprazole (PriLOSEC) 20 mg DR capsule Take 1 capsule (20 mg) by mouth once daily.    Historical Provider, MD   OXcarbazepine (Trileptal) 300 mg tablet TAKE 1 & 1/2 TABLETS BY MOUTH EVERY NIGHT AT BEDTIME 3/13/25   Historical Provider, MD   risperiDONE (RisperDAL) 2 mg tablet Take 1 tablet (2 mg) by mouth 2 times a day.    Historical Provider, MD   rivaroxaban (Xarelto) 10 mg tablet Take 2.5 mg by mouth 2 times a day.    Historical Provider, MD   Trintellix 10 mg tablet tablet Take 1 tablet (10 mg) by mouth once daily.    Historical Provider, MD        PAT ROS:   Constitutional:    no fever   no chills   no unexpected weight change  Neuro/Psych:    no numbness   no weakness   no light-headedness   no tremors   no confusion   not nervous/anxious   no self-injury   no suicidal ideation  Eyes:    no discharge   no vision loss   no diplopia   no visual disturbance   use of corrective lenses  Ears:    no ear pain   no hearing loss   no vertigo   no tinnitus   no hearing aides  Nose:    no nasal discharge   no sinus congestion   no epistaxis  Mouth:    no dental issues   no mouth pain   no oral bleeding   no mouth lesions  Throat:    no throat pain   no dysphagia   no voice change  Neck:    no neck pain   neck swelling   no neck stiffness   no neck masses  Cardio:    no chest pain   no palpitations   no peripheral edema   no dyspnea   no ZIEGLER   no paroxysmal nocturnal dyspnea  Respiratory:    no cough   no wheezing   no hemoptysis   no shortness of breath  Endocrine:    no cold intolerance   no heat intolerance   no polydipsia  GI:    no abdominal distention   abdominal pain (lower abdominal pain , 5/10 severity.)   no  constipation   no diarrhea   no nausea   no vomiting   no blood in stool  :    no difficulty urinating   no dysuria   no oliguria   no polyuria  Musculoskeletal:    no arthralgias   no myalgias   no decreased ROM   no swelling  Hematologic:    does not bruise/bleed easily   no excessive bleeding   no history of blood transfusion   no blood clots  Skin:   no skin changes   no sores/wound   no rash      Physical Exam  Vitals reviewed.   Constitutional:       General: He is not in acute distress.     Appearance: Normal appearance. He is normal weight. He is not ill-appearing, toxic-appearing or diaphoretic.      Comments: Pleasant and cooperative.    HENT:      Head: Normocephalic.      Nose: Nose normal. No congestion or rhinorrhea.      Mouth/Throat:      Mouth: Mucous membranes are moist.      Pharynx: Oropharynx is clear. No oropharyngeal exudate or posterior oropharyngeal erythema.   Eyes:      General: No scleral icterus.        Right eye: No discharge.         Left eye: No discharge.      Conjunctiva/sclera: Conjunctivae normal.   Neck:      Vascular: No carotid bruit.   Cardiovascular:      Rate and Rhythm: Normal rate and regular rhythm.      Pulses: Normal pulses.      Heart sounds: Normal heart sounds. No murmur heard.     No friction rub. No gallop.   Pulmonary:      Effort: Pulmonary effort is normal. No respiratory distress.      Breath sounds: Normal breath sounds. No stridor. No wheezing, rhonchi or rales.   Chest:      Chest wall: No tenderness.   Musculoskeletal:         General: No swelling or tenderness.      Cervical back: Normal range of motion. No rigidity or tenderness.   Neurological:      General: No focal deficit present.      Mental Status: He is alert and oriented to person, place, and time. Mental status is at baseline.      Comments: Slow to answer, however primary historian with assistance from family at bedside. Noted as baseline.    Psychiatric:         Mood and Affect: Mood normal.   "       Behavior: Behavior normal.         Thought Content: Thought content normal.         Judgment: Judgment normal.          PAT AIRWAY:   Airway:     Mallampati::  II    TM distance::  >3 FB    Neck ROM::  Full        Visit Vitals  /85   Pulse 69   Temp 36.7 °C (98 °F) (Temporal)   Ht 1.676 m (5' 6\")   Wt 115 kg (252 lb 11.2 oz)   SpO2 98%   BMI 40.79 kg/m²   Smoking Status Every Day   BSA 2.31 m²       DASI Risk Score      Flowsheet Row Pre-Admission Testing from 4/22/2025 in [facility]   Can you take care of yourself (eat, dress, bathe, or use toilet)?  2.75 filed at 04/22/2025 1003   Can you walk indoors, such as around your house? 1.75 filed at 04/22/2025 1003   Can you walk a block or two on level ground?  2.75 filed at 04/22/2025 1003   Can you climb a flight of stairs or walk up a hill? 5.5 filed at 04/22/2025 1003   Can you run a short distance? 8 filed at 04/22/2025 1003   Can you do light work around the house like dusting or washing dishes? 2.7 filed at 04/22/2025 1003   Can you do moderate work around the house like vacuuming, sweeping floors or carrying groceries? 3.5 filed at 04/22/2025 1003   Can you do heavy work around the house like scrubbing floors or lifting and moving heavy furniture?  8 filed at 04/22/2025 1003   Can you do yard work like raking leaves, weeding or pushing a mower? 4.5 filed at 04/22/2025 1003   Can you have sexual relations? 5.25 filed at 04/22/2025 1003   Can you participate in moderate recreational activities like golf, bowling, dancing, doubles tennis or throwing a baseball or football? 6 filed at 04/22/2025 1003   Can you participate in strenous sports like swimming, singles tennis, football, basketball, or skiing? 7.5 filed at 04/22/2025 1003   DASI SCORE 58.2 filed at 04/22/2025 1003   METS Score (Will be calculated only when all the questions are answered) 9.9 filed at 04/22/2025 1003          Caprini DVT Assessment      Flowsheet Row " Pre-Admission Testing from 4/22/2025 in [facility]   DVT Score (IF A SCORE IS NOT CALCULATING, MUST SELECT A BMI TO COMPLETE) 6 filed at 04/22/2025 1036   Surgical Factors Major surgery planned, including arthroscopic and laproscopic (1-2 hours) filed at 04/22/2025 1036   BMI (BMI MUST BE CHOSEN) 31-40 (Obesity) filed at 04/22/2025 1036          Modified Frailty Index    No data to display       [id number] Stroke Risk Points  Current as of 57 minutes ago        N/A 0 to 9 Points:      Last Change: N/A          The [id number] risk score (Lip ANISA, et al. 2009. © 2010 [employer]) quantifies the risk of stroke for a patient with atrial fibrillation. For patients without atrial fibrillation or under the age of 18 this score appears as N/A. Higher score values generally indicate higher risk of stroke.        This score is not applicable to this patient. Components are not calculated.          Revised Cardiac Risk Index      Flowsheet Row Pre-Admission Testing from 4/22/2025 in [facility]   High-Risk Surgery (Intraperitoneal, Intrathoracic,Suprainguinal vascular) 1 filed at 04/22/2025 1035   History of ischemic heart disease (History of MI, History of positive exercuse test, Current chest paint considered due to myocardial ischemia, Use of nitrate therapy, ECG with pathological Q Waves) 0 filed at 04/22/2025 1035   History of congestive heart failure (pulmonary edemia, bilateral rales or S3 gallop, Paroxysmal nocturnal dyspnea, CXR showing pulmonary vascular redistribution) 0 filed at 04/22/2025 1035   History of cerebrovascular disease (Prior TIA or stroke) 1 filed at 04/22/2025 1035   Pre-operative insulin treatment 0 filed at 04/22/2025 1035   Pre-operative creatinine>2 mg/dl 0 filed at 04/22/2025 1035   Revised Cardiac Risk Calculator 2 filed at 04/22/2025 1035          Apfel Simplified Score      Flowsheet Row Pre-Admission Testing from 4/22/2025 in   [facility]   Smoking status 1 filed at 04/22/2025 1037   History of motion sickness or PONV  0 filed at 04/22/2025 1037   Use of postoperative opioids 1 filed at 04/22/2025 1037   Gender - Female 0=No filed at 04/22/2025 1037   Apfel Simplified Score Calculator 2 filed at 04/22/2025 1037          Risk Analysis Index Results This Encounter    No data found in the last 10 encounters.       Stop Bang Score      Flowsheet Row Pre-Admission Testing from 4/22/2025 in [facility]   Do you snore loudly? 1 filed at 04/22/2025 1139   Do you often feel tired or fatigued after your sleep? 0 filed at 04/22/2025 1139   Has anyone ever observed you stop breathing in your sleep? 1 filed at 04/22/2025 1139   Do you have or are you being treated for high blood pressure? 0 filed at 04/22/2025 1139   Recent BMI (Calculated) 40.8 filed at 04/22/2025 1139   Is BMI greater than 35 kg/m2? 1=Yes filed at 04/22/2025 1139   Age older than 50 years old? 0=No filed at 04/22/2025 1139   Is your neck circumference greater than 17 inches (Male) or 16 inches (Female)? 0 filed at 04/22/2025 1139   Gender - Male 1=Yes filed at 04/22/2025 1139   STOP-BANG Total Score 4 filed at 04/22/2025 1139          Prodigy: High Risk  Total Score: 8              Prodigy Gender Score          ARISCAT Score for Postoperative Pulmonary Complications      Flowsheet Row Pre-Admission Testing from 4/22/2025 in [facility]   Age Calculated Score 3 filed at 04/22/2025 1037   Preoperative SpO2 0 filed at 04/22/2025 1037   Respiratory infection in the last month Either upper or lower (i.e., URI, bronchitis, pneumonia), with fever and antibiotic treatment 0 filed at 04/22/2025 1037   Preoperative anemia (Hgb less than 10 g/dl) 0 filed at 04/22/2025 1037   Surgical incision  15 filed at 04/22/2025 1037   Duration of surgery  0 filed at 04/22/2025 1037   Emergency Procedure  0 filed at 04/22/2025 1037   ARISCAT Total Score  18  filed at 04/22/2025 Lorenzo Moss Perioperative Risk for Myocardial Infarction or Cardiac Arrest (SALLIE)    No data to display         Assessment and Plan:   Anesthesia/Airway:  No anesthesia complications      Neuro:    #CVA (4 years prior), depression, vascular dementia, TIA (reports 2-3 years prior), migraines, intellectual disability-patient reports he currently stays at group home due to residual symptoms following stroke approximately 4 years ago.  Patient family is at bedside to assist with history however patient is historian.  Patient states current residual symptoms following stroke is right arm contracture, other possible resolution of additional symptoms.  Patient follows with neurology.  Currently medicated with Wellbutrin (continue), Cymbalta (continue), Trileptal (continue), Risperdal (continue), vortioxetine (continue) he is also taking Xarelto due to increased stroke risk (hold Xarelto 5/1 however continue aspirin therapy)    Patient is at an increased risk for post operative delirium secondary to cognitive impairment, depression, and type and duration of surgery.  Preoperative brain exercise educational handout provided to patient.    The patient is at an increased risk for perioperative stroke secondary to previous CVA/TIA, cardiac disease, preoperative interrupton of antithrombotic, carotid stenosis, HTN, HLD, and general anesthesia.       HEENT/Airway:    No HEENT diagnosis or significant findings on chart review or clinical presentation and evaluation. No further preoperative testing/intervention indicated at this time.      Cardiovascular:    #CAD, HLD, HTN, Carotid Stenosis (s/p stenting 2009 left ICA), -  medicated with aspirin (continue), atorvastatin (continue), lisinopril (last dose morning 5/4), Xarelto (last dose 5/1) and follows with PCP and vascular. BP today is 125/85 and denies cardiovascular symptoms. Physical exam is benign. METS is >4 and scheduled for non-cardiac surgery.   No additional preoperative testing is currently indicated.    METS are 9.9    RCRI  2 which is 10.1% 30 day risk of MACE (risk for cardiac death, nonfatal myocardial infarction, and nonfactal cardiac arrest    SALLIE score which indicates a   0.8 % risk of intraoperative or 30-day postoperative MACE    EKG 25  Normal sinus rhythm  Low voltage QRS  Borderline ECG  (Pending cardiology review)    Carotid US 24  FINDINGS:   Probably chronic occlusion of both cervical internal carotid arteries.   There is antegrade blood flow in the right and left vertebral arteries in the neck.    The peak systolic internal carotid to common carotid artery ratio on the right is 0.6   , which indicates less than 50% narrowing by velocity ratio criteria.   The peak systolic internal carotid to common carotid artery ratio on the left is 0.6,   which indicates less than 50% narrowing by velocity ratio criteria.     Echo 2023            Pulmonary:    #XI (no CPAP), Asthma, Tobacco Use Disorder-patient reports asthma is well-controlled and uses albuterol about once weekly for shortness of breath, patient has not had exacerbations or hospitalizations within the past year and physical exam is benign.  Patient denies respiratory symptoms.  Patient is a cigarette smoker and smoking cessation as well as perioperative risk was discussed patient states he is noncompliant with CPAP due to difficulty wearing mask.  Preoperative deep breathing educational handout provided to patient.    Patient is at increased risk of perioperative complications secondary to  Tobacco abuse, obesity, types of anesthetic    ARISCAT:    18  points which is a low (1.6%) risk of in-hospital post-op pulmonary complications     PRODIGY:  8  points which is a intermediate risk of post op opioid induced respiratory depression episodes    STOP BAN   points which is a intermediate risk for moderate to severe XI    Smoking cessation discussed with patient,  patient also provided cessation education/hotline handout, [facility] toilet education discussed, patient also provided deep breathing exercises and incentive spirometry educational handout      Renal:   No renal diagnosis, however patient is at increase risk for perioperative renal complications secondary to  BMI equal to or greater than 30, HTN, PAD, cerebral vascular disease, intraperitoneal surgery, use of an ace, arb, or NSAID       Endocrine:   # Hypothyroidism-patient is currently medicated with levothyroxine (continue), TSH updated today.       Hematologic/Oncology:      Preoperative DVT educational handout provided to patient.  No hematologic diagnosis, however patient is at an increased risk for DVT  Caprini Score:  6  points which is a highest risk of perioperative VTE      Gastrointestinal:   #Chronic cholecystitis, GERD -patient is currently seeking surgical intervention for cholecystectomy.  Patient is medicated with omeprazole (continue),      EAT-10 score of    0  : self-perceived oropharyngeal dysphagia scale (0-40)     Apfel: 2 points 39% risk for post operative N/V      Infectious disease:     No infectious diagnosis or significant findings on chart review or clinical presentation and evaluation.       Musculoskeletal:    #OA, neuropathy,-patient follows with primary care and no further perioperative intervention      Other:     Hold Vit D supplementation day of surgery  Hold all other vitamins and supplements 7 days prior to surgery  Tylenol okay to continue, please hold Aleve/naproxen/ibuprofen (NSAIDs) for 7 days prior to surgery  No lotion/moisturizers or Deoderant after last shower prior to surgery        Labs ordered    ntains abnormal data SCAN AND CBC  Order: [id number]  Component  Ref Range & Units 1 mo ago   WBC  4.1 - 10.5 10*3/uL 6.4   UNCORRECTED WHITE BLOOD COUNT  4.1 - 10.5 10*3/uL 6.4   RBC  3.90 - 5.60 10*6/uL 4.12   HEMOGLOBIN  13.0 - 17.0 g/dL 13.5   HEMATOCRIT  38.8 - 50.0 % 39.4    MCV  83.5 - 101 fL 95.8   MCH  27.5 - 35.2 pg 32.9   MCHC  32.5 - 35.6 g/dL 34.4   RED CELL DISTRIBUTION WIDTH, RDW  12.0 - 14.8 % 13.6   PLATELET COUNT  150 - 450 10*3/uL 260   MEAN PLATELET VOLUME, MPV  6.6 - 10.1 fL 6.3 Low    NEUTROPHILS, %  . % 60.1   LYMPHOCYTES, %  . % 30.4   MONOCYTE/MACROPHAGE, %  . % 5.9   EOSINOPHILS, %  . % 2.5   BASOPHILS, %  . % 1.1   NRBC  0 - 0.5 /100{WBC 0.1   NEUTROPHILS  1.8 - 7.7 10*3/uL 3.8   LYMPHOCYTES  1.00 - 4.8 10*3/uL 1.9   MONOCYTES  0.0 - 0.8 10*3/uL 0.4   EOSINOPHILS  0.0 - 0.45 10*3/uL 0.2   BASOPHILS  0.0 - 0.2 10*3/uL 0.1   RBC MORPHOLOGY  Normal Normal   PLATELET ESTIMATE  Normal Normal   PLATELET MORPHOLOGY  Normal Normal   Resulting [facility]     Specimen Collected: 03/06/25 11:55        Contains abnormal data Basic metabolic panel  Order: [id number]  Component  Ref Range & Units 1 mo ago   Glucose  70 - 100 mg/dL 93   Comment: Random Glucose Reference Range is dependent on time and  content of last meal. Glucose of more than 200 mg/dL in a  nonstressed, ambulatory subject supports the diagnosis of  Diabetes Mellitus.  ADA recommended reference range   BUN  7 - 25 mg/dL 12   CREATININE  0.70 - 1.30 mg/dL 1.06   ESTIMATED GFR  mL/Min >60.0   Sodium  136 - 145 mmol/L 136   Potassium, Bld  3.5 - 5.1 mmol/L 4.6   Chloride  98 - 107 mmol/L 101   Carbon Dioxide  21.0 - 31.0 mmol/L 29.6   Anion Gap  6.0 - 15.0 meq/L 10   Calcium  8.6 - 10.3 mg/dL 8.5 Low    Resulting [facility]     Specimen Collected: 03/06/25 11:55     HEMOGRAM CBC WITHOUT DIFF ([facility])  Order: [id number]  Component  Ref Range & Units 1 mo ago   WBC  4.1 - 10.5 10*3/uL 10.6 High    RBC  3.90 - 5.60 10*6/uL 3.93   HEMOGLOBIN  13.0 - 17.0 g/dL 13.1   HEMATOCRIT  38.8 - 50.0 % 38.2 Low    MCV  83.5 - 101 fL 97.2   MCH  27.5 - 35.2 pg 33.2   MCHC  32.5 - 35.6 g/dL 34.2   RED CELL DISTRIBUTION WIDTH, RDW  12.0 - 14.8 % 13.6   PLATELET COUNT  150 - 450  10*3/uL 279   MEAN PLATELET VOLUME, MPV  6.6 - 10.1 fL 6.5 [facility]     Specimen Collected: 03/21/25 05:50       Recent Results (from the past 12 weeks)   Type And Screen Is this order related to pregnancy or an upcoming surgery? Yes; Where will this surgery/delivery be performed? [facility]; What is the date of the surgery? 5/5/2025; Has this patient ever had a transfusion? No; Has th...    Collection Time: 04/22/25 10:24 AM   Result Value Ref Range    ABO TYPE O     Rh TYPE POS     ANTIBODY SCREEN NEG    TSH with reflex to Free T4 if abnormal    Collection Time: 04/22/25 10:24 AM   Result Value Ref Range    Thyroid Stimulating Hormone 2.90 0.44 - 3.98 mIU/L           Medication instructions and NPO guidelines reviewed with the patient.  All questions or concerns discussed and addressed.      Saumya Wilson PA-C           [1]   Past Medical History:  Diagnosis Date    Arthritis     Asthma     albuterol once weekly for SOB,    Calculus of common bile duct and gallbladder with chronic cholecystitis     Choledocholithiasis     Chronic anticoagulation     Coronary artery disease     Depression     GERD (gastroesophageal reflux disease)     controlled    Hypercholesterolemia     Hypertension     Hypothyroidism     Migraines     On Trileptal    Occlusion and stenosis of left carotid artery     Peripheral neuropathy     Sleep apnea     not using cpap    Stroke (Multi)     on Xarelto and ASA    TIA (transient ischemic attack)     Vascular dementia    [2]   Past Surgical History:  Procedure Laterality Date    APPENDECTOMY      CAROTID STENT  2009    ERCP W/ PLASTIC STENT PLACEMENT  01/23/2025    HERNIA REPAIR      SKIN GRAFT      childhood   [3]   Family History  Problem Relation Name Age of Onset    No Known Problems Mother      Pulmonary embolism Father      Heart disease Father     [4] No Known Allergies

## 2025-04-22 NOTE — PREPROCEDURE INSTRUCTIONS
Thank you for visiting [facility] ([facility]) today for your pre-procedure evaluation, you were seen by     Saumya Wilson PA-C   Department of Anesthesiology and Perioperative Medicine  Main phone [phone]  Fax [phone]    This summary includes instructions and information to aid you during your perioperative period.  Please read carefully. If you have any questions about your visit today, please call the number listed above.  If you become ill or have any changes to your health before your surgery, please contact your primary care provider and alert your surgeon.    General Medications Instructions (see back for further medication instructions)  Hold Vit D supplementation day of surgery  Hold all other vitamins and supplements 7 days prior to surgery  Tylenol okay to continue, please hold Aleve/naproxen/ibuprofen (NSAIDs) for 7 days prior to surgery  No lotion/moisturizers or Deoderant after last shower prior to surgery    You will be called business day prior to surgery to confirm arrival time.     Preparing for Surgery       Preoperative Fasting Guidelines  Why must I stop eating and drinking near surgery time?  With sedation, food or liquid in your stomach can enter your lungs causing serious complications  Food can increase nausea and vomiting  When do I need to stop eating and drinking before my surgery?  Do not eat any food after midnight the night before your surgery/procedure.  You may have up to 13.5 ounces of clear liquid until TWO hours before your instructed arrival time to the hospital.  This includes water, black tea/coffee, (no milk or cream) apple juice, and electrolyte drinks (Gatorade)  You may chew gum until TWO hours before your surgery/procedure  Tobacco and Alcohol;  Do not drink alcohol or smoke within 24 hours of surgery.  It is best to quit smoking for as long as possible before any surgery or procedure.      The Week before Surgery        Seven days  before Surgery  Check your [facility] medication instructions  Do the exercises provided to you by [facility]   Arrange for a responsible, adult licensed  to take you home after surgery and stay with you for 24 hours.  You will not be permitted to drive yourself home if you have received any anesthetic/sedation  Five days before surgery  Check your [facility] medication instructions  Do the exercises provided to you by [facility]   Start using Chlorhexidene (CHG) body wash if prescribed (Continue till day of surgery)      The Day before Surgery       Check your [facility] medication and all other [facility] instructions including when to stop eating and drinking  You will be called with your arrival time for surgery in the late afternoon.  If you do not receive a call please reach out to your surgeon's office.  Do not smoke or drink 24 hours before surgery  Prepare items to bring with you to the hospital  Shower with your chlorhexidine wash if prescribed  Brush your teeth and use your chlorhexidine dental rinse if prescribed    The Day of Surgery       Check your [facility] medication instructions  Ensure you follow the instructions for when to stop eating and drinking  Shower, if prescribed use CHG.  Do not apply any lotions, creams, moisturizers, perfume or deodorant  Brush your teeth and use your CHG dental rinse if prescribed  Wear loose comfortable clothing  Avoid make-up  Remove  jewelry and piercings, consider professional piercing removal with a plastic spacer if needed  Bring photo ID and Insurance card  Bring an accurate medication list that includes medication dose, frequency and allergies  Bring a copy of your advanced directives (will, health care power of )  Bring any devices and controllers as well as medical devices you have been provided with for surgery (CPAP, slings, braces, etc.)  Dentures, eyeglasses, and contacts will be removed before surgery, please bring cases for contacts or glasses    Preoperative Deep Breathing  Exercises    Why it is important to do deep breathing exercises before my surgery?  Deep breathing exercises strengthen your breathing muscles.  This helps you to recover after your surgery and decreases the chance of breathing complications.    How are the deep breathing exercises done?  Sit straight with your back supported.  Breathe in deeply and slowly through your nose. Your lower rib cage should expand and your abdomen may move forward.  Hold that breath for 3 to 5 seconds.  Breathe out through pursed lips, slowly and completely.  Rest and repeat 10 times every hour while awake.  Rest longer if you become dizzy or lightheaded.      Preoperative Brain Exercises    What are brain exercises?  A brain exercise is any activity that engages your thinking (cognitive) skills.    What types of activities are considered brain exercises?  Jigsaw puzzles, crossword puzzles, word jumble, memory games, word search, and many more.  Many can be found free online or on your phone via a mobile emi.    Why should I do brain exercises before my surgery?  More recent research has shown brain exercise before surgery can lower the risk of postoperative delirium (confusion) which can be especially important for older adults.  Patients who did brain exercises for 5 to 10 hours the days before surgery, cut their risk of postoperative delirium in half up to 1 week after surgery.    Sit-to-Stand Exercise    What is the sit-to-stand exercise?  The sit-to-stand exercise strengthens the muscles of your lower body and muscles in the center of your body (core muscles for stability) helping to maintain and improve your strength and mobility.  How do I do the sit-to-stand exercise?  The goal is to do this exercise without using your arms or hands.  If this is too difficult, use your arms and hands or a chair with armrests to help slowly push yourself to the standing position and lower yourself back to the sitting position. As the movement  becomes easier use your arms and hands less.    Steps to the sit-to-stand exercise  Sit up tall in a sturdy chair, knees bent, feet flat on the floor shoulder-width apart.  Shift your hips/pelvis forward in the chair to correctly position yourself for the next movement.  Lean forward at your hips.  Stand up straight putting equal weight on both feet.  Check to be sure you are properly aligned with the chair, in a slow controlled movement sit back down.  Repeat this exercise 10-15 times.  If needed you can do it fewer times until your strength improves.  Rest for 1 minute.  Do another 10-15 sit-to-stand exercises.  Try to do this in the morning and evening.       Simple things you can do to help prevent blood clots     Blood clots are blockages that can form in the body's veins. When a blood clot forms in your deep veins, it may be called a deep vein thrombosis, or DVT for short. Blood clots can happen in any part of the body where blood flows, but they are most common in the arms and legs. If a piece of a blood clot breaks free and travels to the lungs, it is called a pulmonary embolus (PE). A PE can be a very serious problem.      Being in the hospital or having surgery can raise your chances of getting a blood clot because you may not be well enough to move around as much as you normally do.         Ways you can help prevent blood clots in the hospital       Wearing SCDs  SCDs stands for Sequential Compression Devices.   SCDs are special sleeves that wrap around your legs. They attach to a pump that fills them with air to gently squeeze your legs every few minutes.  This helps return the blood in your legs to your heart.   SCDs should only be taken off when walking or bathing. SCDs may not be comfortable, but they can help save your life.              Pump SCD leg sleeves  Wearing compression stockings - if your doctor orders them. These special snug-fitting stockings gently squeeze your legs to help blood flow.        Walking. Walking helps move the blood in your legs.   If your doctor says it is ok, try walking the halls at least   5 times a day. Ask us to help you get up, so you don't fall.      Taking any blood-thinning medicines your doctor orders.              Ways you can help prevent blood clots at home         Wearing compression stockings - if your doctor orders them.   Walking - to help move the blood in your legs.    Taking any blood-thinning medicines your doctor orders.      Signs of a blood clot or PE    Tell your doctor or nurse right away if you have any of the problems listed below.         If you are at home, seek medical care right away. Call 911 for chest pain or problems breathing.            Signs of a blood clot (DVT) - such as pain, swelling, redness, or warmth in your arm or legs.  Signs of a pulmonary embolism (PE) - such as chest pain or feeling short of breath

## 2025-04-22 NOTE — NURSING NOTE
Patient seen in PAT. Orders reviewed with PAT Provider.     Following labs obtained by documenting RN:   TSH, T/S    EKG: COMPLETED TODAY    Patient discharged with: Pre-procedure instructions/AVS.        Tanika AYALAN, RN  [facility]   [facility]

## 2025-04-22 NOTE — H&P (VIEW-ONLY)
CPM/PAT Evaluation       Name: Erick Martinez (Erick Martinez)  /Age: 1975/49 y.o.     Visit Type:   In-Person       Chief Complaint: perioperative evaluation    HPI HPI: 48 y/o male scheduled for Cholecystectomy Robot-Assisted, POSSIBLE OPEN  on 2025  secondary to Chronic cholecystitis  with Dr. Karthik Matthews  who referred to CPM.  Presents to [facility] today for perioperative risk stratification and optimization. PMHX includes CVA (4 years prior), depression, vascular dementia, TIA (reports 2-3 years prior), migraines, intellectual disability, CAD, HLD, HTN, Carotid Stenosis (s/p stenting 2009 left ICA), XI (no CPAP), Asthma, Tobacco Use Disorder, Hypothyroidism, Chronic cholecystitis, OA, neuropathy.    PCP: Savage Barajas MD at [facility] P: [phone] in media  Specialists:   Oncology - Karthik Matthews MD   Gen Surgery - Dav BECKMAN MD   Neurology - RAJAN Alejandro   Vascular - Torrey Cruz MD            Medical History[1]    Surgical History[2]    Patient  has no history on file for sexual activity.    Family History[3]    Allergies[4]    Prior to Admission medications    Medication Sig Start Date End Date Taking? Authorizing Provider   aspirin 81 mg EC tablet Take 1 tablet (81 mg) by mouth once daily. 25   Historical Provider, MD   atorvastatin (Lipitor) 80 mg tablet Take 1 tablet (80 mg) by mouth once daily.    Historical Provider, MD   buPROPion XL (Wellbutrin XL) 300 mg 24 hr tablet Take 1 tablet (300 mg) by mouth once daily in the morning. Take before meals.    Historical Provider, MD   cholecalciferol (Vitamin D-3) 50 mcg (2,000 unit) capsule Take 1 capsule (50 mcg) by mouth once daily.    Historical Provider, MD   cyanocobalamin (Vitamin B-12) 500 mcg tablet Take 1 tablet (500 mcg) by mouth once daily. 25   Historical Provider, MD   DOCOSAHEXAENOIC ACID ORAL Take 1,000 mg by mouth once daily.    Historical Provider, MD   DULoxetine (Cymbalta) 60 mg   capsule Take 1 capsule (60 mg) by mouth once daily.    Historical Provider, MD   levothyroxine (Synthroid, Levoxyl) 75 mcg tablet Take 1 tablet (75 mcg) by mouth once daily.    Historical Provider, MD   lisinopril 5 mg tablet Take 1 tablet (5 mg) by mouth once daily.    Historical Provider, MD   omeprazole (PriLOSEC) 20 mg DR capsule Take 1 capsule (20 mg) by mouth once daily.    Historical Provider, MD   OXcarbazepine (Trileptal) 300 mg tablet TAKE 1 & 1/2 TABLETS BY MOUTH EVERY NIGHT AT BEDTIME 3/13/25   Historical Provider, MD   risperiDONE (RisperDAL) 2 mg tablet Take 1 tablet (2 mg) by mouth 2 times a day.    Historical Provider, MD   rivaroxaban (Xarelto) 10 mg tablet Take 2.5 mg by mouth 2 times a day.    Historical Provider, MD   Trintellix 10 mg tablet tablet Take 1 tablet (10 mg) by mouth once daily.    Historical Provider, MD        PAT ROS:   Constitutional:    no fever   no chills   no unexpected weight change  Neuro/Psych:    no numbness   no weakness   no light-headedness   no tremors   no confusion   not nervous/anxious   no self-injury   no suicidal ideation  Eyes:    no discharge   no vision loss   no diplopia   no visual disturbance   use of corrective lenses  Ears:    no ear pain   no hearing loss   no vertigo   no tinnitus   no hearing aides  Nose:    no nasal discharge   no sinus congestion   no epistaxis  Mouth:    no dental issues   no mouth pain   no oral bleeding   no mouth lesions  Throat:    no throat pain   no dysphagia   no voice change  Neck:    no neck pain   neck swelling   no neck stiffness   no neck masses  Cardio:    no chest pain   no palpitations   no peripheral edema   no dyspnea   no ZIEGLER   no paroxysmal nocturnal dyspnea  Respiratory:    no cough   no wheezing   no hemoptysis   no shortness of breath  Endocrine:    no cold intolerance   no heat intolerance   no polydipsia  GI:    no abdominal distention   abdominal pain (lower abdominal pain , 5/10 severity.)   no  constipation   no diarrhea   no nausea   no vomiting   no blood in stool  :    no difficulty urinating   no dysuria   no oliguria   no polyuria  Musculoskeletal:    no arthralgias   no myalgias   no decreased ROM   no swelling  Hematologic:    does not bruise/bleed easily   no excessive bleeding   no history of blood transfusion   no blood clots  Skin:   no skin changes   no sores/wound   no rash      Physical Exam  Vitals reviewed.   Constitutional:       General: He is not in acute distress.     Appearance: Normal appearance. He is normal weight. He is not ill-appearing, toxic-appearing or diaphoretic.      Comments: Pleasant and cooperative.    HENT:      Head: Normocephalic.      Nose: Nose normal. No congestion or rhinorrhea.      Mouth/Throat:      Mouth: Mucous membranes are moist.      Pharynx: Oropharynx is clear. No oropharyngeal exudate or posterior oropharyngeal erythema.   Eyes:      General: No scleral icterus.        Right eye: No discharge.         Left eye: No discharge.      Conjunctiva/sclera: Conjunctivae normal.   Neck:      Vascular: No carotid bruit.   Cardiovascular:      Rate and Rhythm: Normal rate and regular rhythm.      Pulses: Normal pulses.      Heart sounds: Normal heart sounds. No murmur heard.     No friction rub. No gallop.   Pulmonary:      Effort: Pulmonary effort is normal. No respiratory distress.      Breath sounds: Normal breath sounds. No stridor. No wheezing, rhonchi or rales.   Chest:      Chest wall: No tenderness.   Musculoskeletal:         General: No swelling or tenderness.      Cervical back: Normal range of motion. No rigidity or tenderness.   Neurological:      General: No focal deficit present.      Mental Status: He is alert and oriented to person, place, and time. Mental status is at baseline.      Comments: Slow to answer, however primary historian with assistance from family at bedside. Noted as baseline.    Psychiatric:         Mood and Affect: Mood normal.   "       Behavior: Behavior normal.         Thought Content: Thought content normal.         Judgment: Judgment normal.          PAT AIRWAY:   Airway:     Mallampati::  II    TM distance::  >3 FB    Neck ROM::  Full        Visit Vitals  /85   Pulse 69   Temp 36.7 °C (98 °F) (Temporal)   Ht 1.676 m (5' 6\")   Wt 115 kg (252 lb 11.2 oz)   SpO2 98%   BMI 40.79 kg/m²   Smoking Status Every Day   BSA 2.31 m²       DASI Risk Score      Flowsheet Row Pre-Admission Testing from 4/22/2025 in [facility]   Can you take care of yourself (eat, dress, bathe, or use toilet)?  2.75 filed at 04/22/2025 1003   Can you walk indoors, such as around your house? 1.75 filed at 04/22/2025 1003   Can you walk a block or two on level ground?  2.75 filed at 04/22/2025 1003   Can you climb a flight of stairs or walk up a hill? 5.5 filed at 04/22/2025 1003   Can you run a short distance? 8 filed at 04/22/2025 1003   Can you do light work around the house like dusting or washing dishes? 2.7 filed at 04/22/2025 1003   Can you do moderate work around the house like vacuuming, sweeping floors or carrying groceries? 3.5 filed at 04/22/2025 1003   Can you do heavy work around the house like scrubbing floors or lifting and moving heavy furniture?  8 filed at 04/22/2025 1003   Can you do yard work like raking leaves, weeding or pushing a mower? 4.5 filed at 04/22/2025 1003   Can you have sexual relations? 5.25 filed at 04/22/2025 1003   Can you participate in moderate recreational activities like golf, bowling, dancing, doubles tennis or throwing a baseball or football? 6 filed at 04/22/2025 1003   Can you participate in strenous sports like swimming, singles tennis, football, basketball, or skiing? 7.5 filed at 04/22/2025 1003   DASI SCORE 58.2 filed at 04/22/2025 1003   METS Score (Will be calculated only when all the questions are answered) 9.9 filed at 04/22/2025 1003          Caprini DVT Assessment      Flowsheet Row " Pre-Admission Testing from 4/22/2025 in [facility]   DVT Score (IF A SCORE IS NOT CALCULATING, MUST SELECT A BMI TO COMPLETE) 6 filed at 04/22/2025 1036   Surgical Factors Major surgery planned, including arthroscopic and laproscopic (1-2 hours) filed at 04/22/2025 1036   BMI (BMI MUST BE CHOSEN) 31-40 (Obesity) filed at 04/22/2025 1036          Modified Frailty Index    No data to display       [id number] Stroke Risk Points  Current as of 57 minutes ago        N/A 0 to 9 Points:      Last Change: N/A          The [id number] risk score (Lip ANISA, et al. 2009. © 2010 American College [employer]) quantifies the risk of stroke for a patient with atrial fibrillation. For patients without atrial fibrillation or under the age of 18 this score appears as N/A. Higher score values generally indicate higher risk of stroke.        This score is not applicable to this patient. Components are not calculated.          Revised Cardiac Risk Index      Flowsheet Row Pre-Admission Testing from 4/22/2025 in [facility]   High-Risk Surgery (Intraperitoneal, Intrathoracic,Suprainguinal vascular) 1 filed at 04/22/2025 1035   History of ischemic heart disease (History of MI, History of positive exercuse test, Current chest paint considered due to myocardial ischemia, Use of nitrate therapy, ECG with pathological Q Waves) 0 filed at 04/22/2025 1035   History of congestive heart failure (pulmonary edemia, bilateral rales or S3 gallop, Paroxysmal nocturnal dyspnea, CXR showing pulmonary vascular redistribution) 0 filed at 04/22/2025 1035   History of cerebrovascular disease (Prior TIA or stroke) 1 filed at 04/22/2025 1035   Pre-operative insulin treatment 0 filed at 04/22/2025 1035   Pre-operative creatinine>2 mg/dl 0 filed at 04/22/2025 1035   Revised Cardiac Risk Calculator 2 filed at 04/22/2025 1035          Apfel Simplified Score      Flowsheet Row Pre-Admission Testing from 4/22/2025 in   [facility]   Smoking status 1 filed at 04/22/2025 1037   History of motion sickness or PONV  0 filed at 04/22/2025 1037   Use of postoperative opioids 1 filed at 04/22/2025 1037   Gender - Female 0=No filed at 04/22/2025 1037   Apfel Simplified Score Calculator 2 filed at 04/22/2025 1037          Risk Analysis Index Results This Encounter    No data found in the last 10 encounters.       Stop Bang Score      Flowsheet Row Pre-Admission Testing from 4/22/2025 in [facility]   Do you snore loudly? 1 filed at 04/22/2025 1139   Do you often feel tired or fatigued after your sleep? 0 filed at 04/22/2025 1139   Has anyone ever observed you stop breathing in your sleep? 1 filed at 04/22/2025 1139   Do you have or are you being treated for high blood pressure? 0 filed at 04/22/2025 1139   Recent BMI (Calculated) 40.8 filed at 04/22/2025 1139   Is BMI greater than 35 kg/m2? 1=Yes filed at 04/22/2025 1139   Age older than 50 years old? 0=No filed at 04/22/2025 1139   Is your neck circumference greater than 17 inches (Male) or 16 inches (Female)? 0 filed at 04/22/2025 1139   Gender - Male 1=Yes filed at 04/22/2025 1139   STOP-BANG Total Score 4 filed at 04/22/2025 1139          Prodigy: High Risk  Total Score: 8              Prodigy Gender Score          ARISCAT Score for Postoperative Pulmonary Complications      Flowsheet Row Pre-Admission Testing from 4/22/2025 in [facility]   Age Calculated Score 3 filed at 04/22/2025 1037   Preoperative SpO2 0 filed at 04/22/2025 1037   Respiratory infection in the last month Either upper or lower (i.e., URI, bronchitis, pneumonia), with fever and antibiotic treatment 0 filed at 04/22/2025 1037   Preoperative anemia (Hgb less than 10 g/dl) 0 filed at 04/22/2025 1037   Surgical incision  15 filed at 04/22/2025 1037   Duration of surgery  0 filed at 04/22/2025 1037   Emergency Procedure  0 filed at 04/22/2025 1037   ARISCAT Total Score  18  filed at 04/22/2025 Lorenzo Moss Perioperative Risk for Myocardial Infarction or Cardiac Arrest (SALLIE)    No data to display         Assessment and Plan:   Anesthesia/Airway:  No anesthesia complications      Neuro:    #CVA (4 years prior), depression, vascular dementia, TIA (reports 2-3 years prior), migraines, intellectual disability-patient reports he currently stays at group home due to residual symptoms following stroke approximately 4 years ago.  Patient family is at bedside to assist with history however patient is historian.  Patient states current residual symptoms following stroke is right arm contracture, other possible resolution of additional symptoms.  Patient follows with neurology.  Currently medicated with Wellbutrin (continue), Cymbalta (continue), Trileptal (continue), Risperdal (continue), vortioxetine (continue) he is also taking Xarelto due to increased stroke risk (hold Xarelto 5/1 however continue aspirin therapy)    Patient is at an increased risk for post operative delirium secondary to cognitive impairment, depression, and type and duration of surgery.  Preoperative brain exercise educational handout provided to patient.    The patient is at an increased risk for perioperative stroke secondary to previous CVA/TIA, cardiac disease, preoperative interrupton of antithrombotic, carotid stenosis, HTN, HLD, and general anesthesia.       HEENT/Airway:    No HEENT diagnosis or significant findings on chart review or clinical presentation and evaluation. No further preoperative testing/intervention indicated at this time.      Cardiovascular:    #CAD, HLD, HTN, Carotid Stenosis (s/p stenting 2009 left ICA), -  medicated with aspirin (continue), atorvastatin (continue), lisinopril (last dose morning 5/4), Xarelto (last dose 5/1) and follows with PCP and vascular. BP today is 125/85 and denies cardiovascular symptoms. Physical exam is benign. METS is >4 and scheduled for non-cardiac surgery.   No additional preoperative testing is currently indicated.    METS are 9.9    RCRI  2 which is 10.1% 30 day risk of MACE (risk for cardiac death, nonfatal myocardial infarction, and nonfactal cardiac arrest    SALLIE score which indicates a   0.8 % risk of intraoperative or 30-day postoperative MACE    EKG 25  Normal sinus rhythm  Low voltage QRS  Borderline ECG  (Pending cardiology review)    Carotid US 24  FINDINGS:   Probably chronic occlusion of both cervical internal carotid arteries.   There is antegrade blood flow in the right and left vertebral arteries in the neck.    The peak systolic internal carotid to common carotid artery ratio on the right is 0.6   , which indicates less than 50% narrowing by velocity ratio criteria.   The peak systolic internal carotid to common carotid artery ratio on the left is 0.6,   which indicates less than 50% narrowing by velocity ratio criteria.     Echo 2023            Pulmonary:    #XI (no CPAP), Asthma, Tobacco Use Disorder-patient reports asthma is well-controlled and uses albuterol about once weekly for shortness of breath, patient has not had exacerbations or hospitalizations within the past year and physical exam is benign.  Patient denies respiratory symptoms.  Patient is a cigarette smoker and smoking cessation as well as perioperative risk was discussed patient states he is noncompliant with CPAP due to difficulty wearing mask.  Preoperative deep breathing educational handout provided to patient.    Patient is at increased risk of perioperative complications secondary to  Tobacco abuse, obesity, types of anesthetic    ARISCAT:    18  points which is a low (1.6%) risk of in-hospital post-op pulmonary complications     PRODIGY:  8  points which is a intermediate risk of post op opioid induced respiratory depression episodes    STOP BAN   points which is a intermediate risk for moderate to severe XI    Smoking cessation discussed with patient,  patient also provided cessation education/hotline handout, [facility] toilet education discussed, patient also provided deep breathing exercises and incentive spirometry educational handout      Renal:   No renal diagnosis, however patient is at increase risk for perioperative renal complications secondary to  BMI equal to or greater than 30, HTN, PAD, cerebral vascular disease, intraperitoneal surgery, use of an ace, arb, or NSAID       Endocrine:   # Hypothyroidism-patient is currently medicated with levothyroxine (continue), TSH updated today.       Hematologic/Oncology:      Preoperative DVT educational handout provided to patient.  No hematologic diagnosis, however patient is at an increased risk for DVT  Caprini Score:  6  points which is a highest risk of perioperative VTE      Gastrointestinal:   #Chronic cholecystitis, GERD -patient is currently seeking surgical intervention for cholecystectomy.  Patient is medicated with omeprazole (continue),      EAT-10 score of    0  : self-perceived oropharyngeal dysphagia scale (0-40)     Apfel: 2 points 39% risk for post operative N/V      Infectious disease:     No infectious diagnosis or significant findings on chart review or clinical presentation and evaluation.       Musculoskeletal:    #OA, neuropathy,-patient follows with primary care and no further perioperative intervention      Other:     Hold Vit D supplementation day of surgery  Hold all other vitamins and supplements 7 days prior to surgery  Tylenol okay to continue, please hold Aleve/naproxen/ibuprofen (NSAIDs) for 7 days prior to surgery  No lotion/moisturizers or Deoderant after last shower prior to surgery        Labs ordered    ntains abnormal data SCAN AND CBC  Order: [id number]  Component  Ref Range & Units 1 mo ago   WBC  4.1 - 10.5 10*3/uL 6.4   UNCORRECTED WHITE BLOOD COUNT  4.1 - 10.5 10*3/uL 6.4   RBC  3.90 - 5.60 10*6/uL 4.12   HEMOGLOBIN  13.0 - 17.0 g/dL 13.5   HEMATOCRIT  38.8 - 50.0 % 39.4    MCV  83.5 - 101 fL 95.8   MCH  27.5 - 35.2 pg 32.9   MCHC  32.5 - 35.6 g/dL 34.4   RED CELL DISTRIBUTION WIDTH, RDW  12.0 - 14.8 % 13.6   PLATELET COUNT  150 - 450 10*3/uL 260   MEAN PLATELET VOLUME, MPV  6.6 - 10.1 fL 6.3 Low    NEUTROPHILS, %  . % 60.1   LYMPHOCYTES, %  . % 30.4   MONOCYTE/MACROPHAGE, %  . % 5.9   EOSINOPHILS, %  . % 2.5   BASOPHILS, %  . % 1.1   NRBC  0 - 0.5 /100{WBC 0.1   NEUTROPHILS  1.8 - 7.7 10*3/uL 3.8   LYMPHOCYTES  1.00 - 4.8 10*3/uL 1.9   MONOCYTES  0.0 - 0.8 10*3/uL 0.4   EOSINOPHILS  0.0 - 0.45 10*3/uL 0.2   BASOPHILS  0.0 - 0.2 10*3/uL 0.1   RBC MORPHOLOGY  Normal Normal   PLATELET ESTIMATE  Normal Normal   PLATELET MORPHOLOGY  Normal Normal   Resulting [facility]     Specimen Collected: 03/06/25 11:55        Contains abnormal data Basic metabolic panel  Order: [id number]  Component  Ref Range & Units 1 mo ago   Glucose  70 - 100 mg/dL 93   Comment: Random Glucose Reference Range is dependent on time and  content of last meal. Glucose of more than 200 mg/dL in a  nonstressed, ambulatory subject supports the diagnosis of  Diabetes Mellitus.  ADA recommended reference range   BUN  7 - 25 mg/dL 12   CREATININE  0.70 - 1.30 mg/dL 1.06   ESTIMATED GFR  mL/Min >60.0   Sodium  136 - 145 mmol/L 136   Potassium, Bld  3.5 - 5.1 mmol/L 4.6   Chloride  98 - 107 mmol/L 101   Carbon Dioxide  21.0 - 31.0 mmol/L 29.6   Anion Gap  6.0 - 15.0 meq/L 10   Calcium  8.6 - 10.3 mg/dL 8.5 Low    Resulting [facility]     Specimen Collected: 03/06/25 11:55     HEMOGRAM CBC WITHOUT DIFF ([facility])  Order: [id number]  Component  Ref Range & Units 1 mo ago   WBC  4.1 - 10.5 10*3/uL 10.6 High    RBC  3.90 - 5.60 10*6/uL 3.93   HEMOGLOBIN  13.0 - 17.0 g/dL 13.1   HEMATOCRIT  38.8 - 50.0 % 38.2 Low    MCV  83.5 - 101 fL 97.2   MCH  27.5 - 35.2 pg 33.2   MCHC  32.5 - 35.6 g/dL 34.2   RED CELL DISTRIBUTION WIDTH, RDW  12.0 - 14.8 % 13.6   PLATELET COUNT  150 - 450  10*3/uL 279   MEAN PLATELET VOLUME, MPV  6.6 - 10.1 fL 6.5 [facility]     Specimen Collected: 03/21/25 05:50       Recent Results (from the past 12 weeks)   Type And Screen Is this order related to pregnancy or an upcoming surgery? Yes; Where will this surgery/delivery be performed? [facility]; What is the date of the surgery? 5/5/2025; Has this patient ever had a transfusion? No; Has th...    Collection Time: 04/22/25 10:24 AM   Result Value Ref Range    ABO TYPE O     Rh TYPE POS     ANTIBODY SCREEN NEG    TSH with reflex to Free T4 if abnormal    Collection Time: 04/22/25 10:24 AM   Result Value Ref Range    Thyroid Stimulating Hormone 2.90 0.44 - 3.98 mIU/L           Medication instructions and NPO guidelines reviewed with the patient.  All questions or concerns discussed and addressed.      Saumya Wilson PA-C           [1]   Past Medical History:  Diagnosis Date    Arthritis     Asthma     albuterol once weekly for SOB,    Calculus of common bile duct and gallbladder with chronic cholecystitis     Choledocholithiasis     Chronic anticoagulation     Coronary artery disease     Depression     GERD (gastroesophageal reflux disease)     controlled    Hypercholesterolemia     Hypertension     Hypothyroidism     Migraines     On Trileptal    Occlusion and stenosis of left carotid artery     Peripheral neuropathy     Sleep apnea     not using cpap    Stroke (Multi)     on Xarelto and ASA    TIA (transient ischemic attack)     Vascular dementia    [2]   Past Surgical History:  Procedure Laterality Date    APPENDECTOMY      CAROTID STENT  2009    ERCP W/ PLASTIC STENT PLACEMENT  01/23/2025    HERNIA REPAIR      SKIN GRAFT      childhood   [3]   Family History  Problem Relation Name Age of Onset    No Known Problems Mother      Pulmonary embolism Father      Heart disease Father     [4] No Known Allergies

## 2025-05-02 ENCOUNTER — ANESTHESIA EVENT (OUTPATIENT)
Dept: OPERATING ROOM | Facility: HOSPITAL | Age: 50
End: 2025-05-02
Payer: COMMERCIAL

## 2025-05-05 ENCOUNTER — HOSPITAL ENCOUNTER (OUTPATIENT)
Facility: HOSPITAL | Age: 50
Setting detail: OUTPATIENT SURGERY
Discharge: HOME | End: 2025-05-05
Attending: SURGERY | Admitting: SURGERY
Payer: COMMERCIAL

## 2025-05-05 ENCOUNTER — ANESTHESIA (OUTPATIENT)
Dept: OPERATING ROOM | Facility: HOSPITAL | Age: 50
End: 2025-05-05
Payer: COMMERCIAL

## 2025-05-05 VITALS
RESPIRATION RATE: 16 BRPM | TEMPERATURE: 97.3 F | WEIGHT: 253.97 LBS | SYSTOLIC BLOOD PRESSURE: 102 MMHG | BODY MASS INDEX: 40.82 KG/M2 | OXYGEN SATURATION: 95 % | HEIGHT: 66 IN | HEART RATE: 59 BPM | DIASTOLIC BLOOD PRESSURE: 85 MMHG

## 2025-05-05 DIAGNOSIS — K81.1 CHRONIC CHOLECYSTITIS: Primary | ICD-10-CM

## 2025-05-05 PROBLEM — I63.9 CVA (CEREBRAL VASCULAR ACCIDENT) (MULTI): Status: ACTIVE | Noted: 2025-05-05

## 2025-05-05 PROBLEM — E03.9 HYPOTHYROIDISM: Status: ACTIVE | Noted: 2025-05-05

## 2025-05-05 PROBLEM — I25.10 CAD (CORONARY ARTERY DISEASE): Status: ACTIVE | Noted: 2025-05-05

## 2025-05-05 PROBLEM — R62.50 DEVELOPMENTAL DELAY: Status: ACTIVE | Noted: 2025-05-05

## 2025-05-05 PROBLEM — K44.9 HIATAL HERNIA: Status: ACTIVE | Noted: 2025-05-05

## 2025-05-05 PROBLEM — Z79.01 ANTICOAGULANT LONG-TERM USE: Status: ACTIVE | Noted: 2025-05-05

## 2025-05-05 PROBLEM — I10 HTN (HYPERTENSION): Status: ACTIVE | Noted: 2025-05-05

## 2025-05-05 LAB
ABO GROUP (TYPE) IN BLOOD: NORMAL
RH FACTOR (ANTIGEN D): NORMAL

## 2025-05-05 PROCEDURE — 2500000004 HC RX 250 GENERAL PHARMACY W/ HCPCS (ALT 636 FOR OP/ED): Performed by: STUDENT IN AN ORGANIZED HEALTH CARE EDUCATION/TRAINING PROGRAM

## 2025-05-05 PROCEDURE — 7100000010 HC PHASE TWO TIME - EACH INCREMENTAL 1 MINUTE: Performed by: SURGERY

## 2025-05-05 PROCEDURE — 2500000005 HC RX 250 GENERAL PHARMACY W/O HCPCS: Performed by: STUDENT IN AN ORGANIZED HEALTH CARE EDUCATION/TRAINING PROGRAM

## 2025-05-05 PROCEDURE — 7100000009 HC PHASE TWO TIME - INITIAL BASE CHARGE: Performed by: SURGERY

## 2025-05-05 PROCEDURE — 3600000018 HC OR TIME - INITIAL BASE CHARGE - PROCEDURE LEVEL SIX: Performed by: SURGERY

## 2025-05-05 PROCEDURE — 2500000005 HC RX 250 GENERAL PHARMACY W/O HCPCS: Mod: JZ | Performed by: SURGERY

## 2025-05-05 PROCEDURE — 36415 COLL VENOUS BLD VENIPUNCTURE: CPT | Performed by: ANESTHESIOLOGY

## 2025-05-05 PROCEDURE — 3700000001 HC GENERAL ANESTHESIA TIME - INITIAL BASE CHARGE: Performed by: SURGERY

## 2025-05-05 PROCEDURE — 2720000007 HC OR 272 NO HCPCS: Performed by: SURGERY

## 2025-05-05 PROCEDURE — S2900 ROBOTIC SURGICAL SYSTEM: HCPCS | Performed by: SURGERY

## 2025-05-05 PROCEDURE — 2500000004 HC RX 250 GENERAL PHARMACY W/ HCPCS (ALT 636 FOR OP/ED): Performed by: SURGERY

## 2025-05-05 PROCEDURE — 47563 LAPARO CHOLECYSTECTOMY/GRAPH: CPT | Performed by: SURGERY

## 2025-05-05 PROCEDURE — 74300 X-RAY BILE DUCTS/PANCREAS: CPT | Performed by: SURGERY

## 2025-05-05 PROCEDURE — A47563 PR LAP,CHOLECYSTECTOMY/GRAPH: Performed by: ANESTHESIOLOGY

## 2025-05-05 PROCEDURE — 3700000002 HC GENERAL ANESTHESIA TIME - EACH INCREMENTAL 1 MINUTE: Performed by: SURGERY

## 2025-05-05 PROCEDURE — 2500000002 HC RX 250 W HCPCS SELF ADMINISTERED DRUGS (ALT 637 FOR MEDICARE OP, ALT 636 FOR OP/ED): Performed by: STUDENT IN AN ORGANIZED HEALTH CARE EDUCATION/TRAINING PROGRAM

## 2025-05-05 PROCEDURE — 7100000002 HC RECOVERY ROOM TIME - EACH INCREMENTAL 1 MINUTE: Performed by: SURGERY

## 2025-05-05 PROCEDURE — 3600000017 HC OR TIME - EACH INCREMENTAL 1 MINUTE - PROCEDURE LEVEL SIX: Performed by: SURGERY

## 2025-05-05 PROCEDURE — 88304 TISSUE EXAM BY PATHOLOGIST: CPT | Mod: TC,SUR | Performed by: SURGERY

## 2025-05-05 PROCEDURE — 36620 INSERTION CATHETER ARTERY: CPT | Performed by: STUDENT IN AN ORGANIZED HEALTH CARE EDUCATION/TRAINING PROGRAM

## 2025-05-05 PROCEDURE — 96372 THER/PROPH/DIAG INJ SC/IM: CPT | Performed by: SURGERY

## 2025-05-05 PROCEDURE — 7100000001 HC RECOVERY ROOM TIME - INITIAL BASE CHARGE: Performed by: SURGERY

## 2025-05-05 RX ORDER — DEXMEDETOMIDINE HYDROCHLORIDE 4 UG/ML
INJECTION, SOLUTION INTRAVENOUS CONTINUOUS PRN
Status: DISCONTINUED | OUTPATIENT
Start: 2025-05-05 | End: 2025-05-05

## 2025-05-05 RX ORDER — CEFAZOLIN SODIUM 2 G/100ML
2 INJECTION, SOLUTION INTRAVENOUS ONCE
Status: DISCONTINUED | OUTPATIENT
Start: 2025-05-05 | End: 2025-05-05 | Stop reason: HOSPADM

## 2025-05-05 RX ORDER — FENTANYL CITRATE 50 UG/ML
INJECTION, SOLUTION INTRAMUSCULAR; INTRAVENOUS AS NEEDED
Status: DISCONTINUED | OUTPATIENT
Start: 2025-05-05 | End: 2025-05-05

## 2025-05-05 RX ORDER — SODIUM CHLORIDE 0.9 G/100ML
INJECTION, SOLUTION IRRIGATION AS NEEDED
Status: DISCONTINUED | OUTPATIENT
Start: 2025-05-05 | End: 2025-05-05 | Stop reason: HOSPADM

## 2025-05-05 RX ORDER — LIDOCAINE HYDROCHLORIDE 10 MG/ML
0.1 INJECTION, SOLUTION INFILTRATION; PERINEURAL ONCE
Status: DISCONTINUED | OUTPATIENT
Start: 2025-05-05 | End: 2025-05-05 | Stop reason: HOSPADM

## 2025-05-05 RX ORDER — SODIUM CHLORIDE, SODIUM LACTATE, POTASSIUM CHLORIDE, CALCIUM CHLORIDE 600; 310; 30; 20 MG/100ML; MG/100ML; MG/100ML; MG/100ML
100 INJECTION, SOLUTION INTRAVENOUS CONTINUOUS
Status: ACTIVE | OUTPATIENT
Start: 2025-05-05 | End: 2025-05-05

## 2025-05-05 RX ORDER — BUPIVACAINE HYDROCHLORIDE 5 MG/ML
INJECTION, SOLUTION EPIDURAL; INTRACAUDAL; PERINEURAL AS NEEDED
Status: DISCONTINUED | OUTPATIENT
Start: 2025-05-05 | End: 2025-05-05 | Stop reason: HOSPADM

## 2025-05-05 RX ORDER — INDOCYANINE GREEN AND WATER 25 MG
KIT INJECTION AS NEEDED
Status: DISCONTINUED | OUTPATIENT
Start: 2025-05-05 | End: 2025-05-05

## 2025-05-05 RX ORDER — HYDROMORPHONE HYDROCHLORIDE 1 MG/ML
INJECTION, SOLUTION INTRAMUSCULAR; INTRAVENOUS; SUBCUTANEOUS AS NEEDED
Status: DISCONTINUED | OUTPATIENT
Start: 2025-05-05 | End: 2025-05-05

## 2025-05-05 RX ORDER — ACETAMINOPHEN 10 MG/ML
INJECTION, SOLUTION INTRAVENOUS AS NEEDED
Status: DISCONTINUED | OUTPATIENT
Start: 2025-05-05 | End: 2025-05-05

## 2025-05-05 RX ORDER — MIDAZOLAM HYDROCHLORIDE 1 MG/ML
INJECTION INTRAMUSCULAR; INTRAVENOUS CONTINUOUS PRN
Status: DISCONTINUED | OUTPATIENT
Start: 2025-05-05 | End: 2025-05-05

## 2025-05-05 RX ORDER — ROCURONIUM BROMIDE 10 MG/ML
INJECTION, SOLUTION INTRAVENOUS AS NEEDED
Status: DISCONTINUED | OUTPATIENT
Start: 2025-05-05 | End: 2025-05-05

## 2025-05-05 RX ORDER — HYDROMORPHONE HYDROCHLORIDE 0.2 MG/ML
0.2 INJECTION INTRAMUSCULAR; INTRAVENOUS; SUBCUTANEOUS EVERY 5 MIN PRN
Status: DISCONTINUED | OUTPATIENT
Start: 2025-05-05 | End: 2025-05-05 | Stop reason: HOSPADM

## 2025-05-05 RX ORDER — DIPHENHYDRAMINE HYDROCHLORIDE 50 MG/ML
12.5 INJECTION, SOLUTION INTRAMUSCULAR; INTRAVENOUS ONCE AS NEEDED
Status: DISCONTINUED | OUTPATIENT
Start: 2025-05-05 | End: 2025-05-05 | Stop reason: HOSPADM

## 2025-05-05 RX ORDER — DROPERIDOL 2.5 MG/ML
0.62 INJECTION, SOLUTION INTRAMUSCULAR; INTRAVENOUS ONCE AS NEEDED
Status: DISCONTINUED | OUTPATIENT
Start: 2025-05-05 | End: 2025-05-05 | Stop reason: HOSPADM

## 2025-05-05 RX ORDER — CEFAZOLIN 1 G/1
INJECTION, POWDER, FOR SOLUTION INTRAVENOUS AS NEEDED
Status: DISCONTINUED | OUTPATIENT
Start: 2025-05-05 | End: 2025-05-05

## 2025-05-05 RX ORDER — HEPARIN SODIUM 5000 [USP'U]/ML
5000 INJECTION, SOLUTION INTRAVENOUS; SUBCUTANEOUS ONCE
Status: COMPLETED | OUTPATIENT
Start: 2025-05-05 | End: 2025-05-05

## 2025-05-05 RX ORDER — OXYCODONE HYDROCHLORIDE 5 MG/1
5 TABLET ORAL EVERY 6 HOURS PRN
Qty: 12 TABLET | Refills: 0 | Status: SHIPPED | OUTPATIENT
Start: 2025-05-05 | End: 2025-05-08

## 2025-05-05 RX ORDER — ONDANSETRON HYDROCHLORIDE 2 MG/ML
INJECTION, SOLUTION INTRAVENOUS AS NEEDED
Status: DISCONTINUED | OUTPATIENT
Start: 2025-05-05 | End: 2025-05-05

## 2025-05-05 RX ORDER — ALBUTEROL SULFATE 0.83 MG/ML
2.5 SOLUTION RESPIRATORY (INHALATION) ONCE
Status: COMPLETED | OUTPATIENT
Start: 2025-05-05 | End: 2025-05-05

## 2025-05-05 RX ORDER — PROPOFOL 10 MG/ML
INJECTION, EMULSION INTRAVENOUS AS NEEDED
Status: DISCONTINUED | OUTPATIENT
Start: 2025-05-05 | End: 2025-05-05

## 2025-05-05 RX ORDER — ALBUTEROL SULFATE 0.83 MG/ML
2.5 SOLUTION RESPIRATORY (INHALATION) ONCE AS NEEDED
Status: COMPLETED | OUTPATIENT
Start: 2025-05-05 | End: 2025-05-05

## 2025-05-05 RX ORDER — ESMOLOL HYDROCHLORIDE 10 MG/ML
INJECTION INTRAVENOUS AS NEEDED
Status: DISCONTINUED | OUTPATIENT
Start: 2025-05-05 | End: 2025-05-05

## 2025-05-05 RX ORDER — LIDOCAINE HYDROCHLORIDE 20 MG/ML
INJECTION, SOLUTION INFILTRATION; PERINEURAL AS NEEDED
Status: DISCONTINUED | OUTPATIENT
Start: 2025-05-05 | End: 2025-05-05

## 2025-05-05 RX ORDER — MEPERIDINE HYDROCHLORIDE 25 MG/ML
12.5 INJECTION INTRAMUSCULAR; INTRAVENOUS; SUBCUTANEOUS EVERY 10 MIN PRN
Status: DISCONTINUED | OUTPATIENT
Start: 2025-05-05 | End: 2025-05-05 | Stop reason: HOSPADM

## 2025-05-05 RX ORDER — LABETALOL HYDROCHLORIDE 5 MG/ML
5 INJECTION, SOLUTION INTRAVENOUS ONCE AS NEEDED
Status: DISCONTINUED | OUTPATIENT
Start: 2025-05-05 | End: 2025-05-05 | Stop reason: HOSPADM

## 2025-05-05 RX ADMIN — DEXAMETHASONE SODIUM PHOSPHATE 4 MG: 4 INJECTION INTRA-ARTICULAR; INTRALESIONAL; INTRAMUSCULAR; INTRAVENOUS; SOFT TISSUE at 07:40

## 2025-05-05 RX ADMIN — ESMOLOL HYDROCHLORIDE 40 MG: 10 INJECTION, SOLUTION INTRAVENOUS at 09:04

## 2025-05-05 RX ADMIN — SUGAMMADEX 400 MG: 100 INJECTION, SOLUTION INTRAVENOUS at 09:00

## 2025-05-05 RX ADMIN — SODIUM CHLORIDE, SODIUM LACTATE, POTASSIUM CHLORIDE, AND CALCIUM CHLORIDE: 600; 310; 30; 20 INJECTION, SOLUTION INTRAVENOUS at 07:28

## 2025-05-05 RX ADMIN — Medication 5 L/MIN: at 09:11

## 2025-05-05 RX ADMIN — ACETAMINOPHEN 1000 MG: 10 INJECTION, SOLUTION INTRAVENOUS at 08:11

## 2025-05-05 RX ADMIN — ROCURONIUM BROMIDE 10 MG: 10 INJECTION, SOLUTION INTRAVENOUS at 08:39

## 2025-05-05 RX ADMIN — DEXMEDETOMIDINE HYDROCHLORIDE 0.3 MCG/KG/HR: 4 INJECTION, SOLUTION INTRAVENOUS at 07:45

## 2025-05-05 RX ADMIN — PROPOFOL 150 MG: 10 INJECTION, EMULSION INTRAVENOUS at 07:28

## 2025-05-05 RX ADMIN — ALBUTEROL SULFATE 2.5 MG: 2.5 SOLUTION RESPIRATORY (INHALATION) at 09:33

## 2025-05-05 RX ADMIN — LIDOCAINE HYDROCHLORIDE 100 MG: 20 INJECTION, SOLUTION INFILTRATION; PERINEURAL at 07:27

## 2025-05-05 RX ADMIN — FENTANYL CITRATE 100 MCG: 50 INJECTION, SOLUTION INTRAMUSCULAR; INTRAVENOUS at 07:45

## 2025-05-05 RX ADMIN — CEFAZOLIN 2 G: 1 INJECTION, POWDER, FOR SOLUTION INTRAMUSCULAR; INTRAVENOUS at 07:40

## 2025-05-05 RX ADMIN — HEPARIN SODIUM 5000 UNITS: 5000 INJECTION, SOLUTION INTRAVENOUS; SUBCUTANEOUS at 06:47

## 2025-05-05 RX ADMIN — ONDANSETRON 4 MG: 2 INJECTION, SOLUTION INTRAMUSCULAR; INTRAVENOUS at 08:52

## 2025-05-05 RX ADMIN — PROPOFOL 20 MG: 10 INJECTION, EMULSION INTRAVENOUS at 07:45

## 2025-05-05 RX ADMIN — HYDROMORPHONE HYDROCHLORIDE 0.2 MG: 1 INJECTION, SOLUTION INTRAMUSCULAR; INTRAVENOUS; SUBCUTANEOUS at 08:45

## 2025-05-05 RX ADMIN — INDOCYANINE GREEN AND WATER 25 MG: KIT at 07:20

## 2025-05-05 RX ADMIN — ALBUTEROL SULFATE 2.5 MG: 2.5 SOLUTION RESPIRATORY (INHALATION) at 07:04

## 2025-05-05 RX ADMIN — ROCURONIUM BROMIDE 60 MG: 10 INJECTION, SOLUTION INTRAVENOUS at 07:29

## 2025-05-05 ASSESSMENT — PAIN - FUNCTIONAL ASSESSMENT
PAIN_FUNCTIONAL_ASSESSMENT: 0-10
PAIN_FUNCTIONAL_ASSESSMENT: UNABLE TO SELF-REPORT
PAIN_FUNCTIONAL_ASSESSMENT: 0-10
PAIN_FUNCTIONAL_ASSESSMENT: 0-10
PAIN_FUNCTIONAL_ASSESSMENT: UNABLE TO SELF-REPORT
PAIN_FUNCTIONAL_ASSESSMENT: 0-10
PAIN_FUNCTIONAL_ASSESSMENT: UNABLE TO SELF-REPORT
PAIN_FUNCTIONAL_ASSESSMENT: UNABLE TO SELF-REPORT
PAIN_FUNCTIONAL_ASSESSMENT: 0-10

## 2025-05-05 ASSESSMENT — PAIN SCALES - GENERAL
PAINLEVEL_OUTOF10: 0 - NO PAIN

## 2025-05-05 ASSESSMENT — COLUMBIA-SUICIDE SEVERITY RATING SCALE - C-SSRS
6. HAVE YOU EVER DONE ANYTHING, STARTED TO DO ANYTHING, OR PREPARED TO DO ANYTHING TO END YOUR LIFE?: NO
1. IN THE PAST MONTH, HAVE YOU WISHED YOU WERE DEAD OR WISHED YOU COULD GO TO SLEEP AND NOT WAKE UP?: NO
2. HAVE YOU ACTUALLY HAD ANY THOUGHTS OF KILLING YOURSELF?: NO

## 2025-05-05 NOTE — ANESTHESIA PROCEDURE NOTES
Arterial Line:    Date/Time: 5/5/2025 7:35 AM    Staffing  Performed: resident   Authorized by: Suze Willoughby MD    Performed by: Bobby Gallegso MD    An arterial line was placed. Procedure performed using ultrasound guidance.in the OR for the following indication(s): continuous blood pressure monitoring.    A 20 gauge (size) (length), Angiocath (type) catheter was placed into the Left radial artery, secured by Tegaderm,   Seldinger technique used.  Events:  patient tolerated procedure well with no complications.

## 2025-05-05 NOTE — ANESTHESIA POSTPROCEDURE EVALUATION
Patient: Erick Martinez    Procedure Summary       Date: 05/05/25 Room / Location: [facility] OR 14 / [facility]    Anesthesia Start: 0724 Anesthesia Stop: 0916    Procedure: Cholecystectomy Robot-Assisted Diagnosis:       Chronic cholecystitis      (Chronic cholecystitis [K81.1])    Surgeons: Karthik Matthews MD Responsible Provider: Suze Willoughby MD    Anesthesia Type: general ASA Status: 3            Anesthesia Type: general    Vitals Value Taken Time   /80 05/05/25 09:11   Temp 36.1 05/05/25 09:16   Pulse 54 05/05/25 09:13   Resp 18 05/05/25 09:13   SpO2 98 % 05/05/25 09:13   Vitals shown include unfiled device data.    Anesthesia Post Evaluation    Patient location during evaluation: PACU  Patient participation: complete - patient participated  Level of consciousness: sedated  Pain management: adequate  Airway patency: patent  Cardiovascular status: acceptable  Respiratory status: acceptable and face mask  Hydration status: acceptable  Postoperative Nausea and Vomiting: none        No notable events documented.

## 2025-05-05 NOTE — ANESTHESIA PREPROCEDURE EVALUATION
Patient: Erick Martinez    Procedure Information       Date/Time: 05/05/25 0645    Procedure: Cholecystectomy Robot-Assisted, POSSIBLE OPEN    Location: [facility] OR 14 / [facility]    Surgeons: Karthik Matthews MD        There were no vitals filed for this visit.    Surgical History[1]  Medical History[2]  Current Medications[3]  Prior to Admission medications    Medication Sig Start Date End Date Taking? Authorizing Provider   aspirin 81 mg EC tablet Take 1 tablet (81 mg) by mouth once daily. 2/12/25  Yes Historical Provider, MD   buPROPion XL (Wellbutrin XL) 300 mg 24 hr tablet Take 1 tablet (300 mg) by mouth once daily in the morning. Take before meals.   Yes Historical Provider, MD   chlorhexidine (Hibiclens) 4 % external liquid Apply topically once daily as needed (preop care). Use for 5 days prior to surgery as body wash, do not use on face or genital region. Leave on 3 min prior to removal 4/22/25  Yes Saumya Wilson PA-C   cholecalciferol (Vitamin D-3) 50 mcg (2,000 unit) capsule Take 1 capsule (2,000 Units) by mouth once daily.   Yes Historical Provider, MD   cyanocobalamin (Vitamin B-12) 500 mcg tablet Take 1 tablet (500 mcg) by mouth once daily. 2/12/25  Yes Historical Provider, MD   levothyroxine (Synthroid, Levoxyl) 75 mcg tablet Take 1 tablet (75 mcg) by mouth once daily.   Yes Historical Provider, MD   omeprazole (PriLOSEC) 20 mg DR capsule Take 1 capsule (20 mg) by mouth once daily.   Yes Historical Provider, MD   OXcarbazepine (Trileptal) 300 mg tablet TAKE 1 & 1/2 TABLETS BY MOUTH EVERY NIGHT AT BEDTIME 3/13/25  Yes Historical Provider, MD   risperiDONE (RisperDAL) 2 mg tablet Take 1 tablet (2 mg) by mouth 2 times a day.   Yes Historical Provider, MD   Trintellix 10 mg tablet tablet Take 1 tablet (10 mg) by mouth once daily.   Yes Historical Provider, MD   atorvastatin (Lipitor) 80 mg tablet Take 1 tablet (80 mg) by mouth once daily.    Historical Provider, MD   DOCOSAHEXAENOIC ACID ORAL  "Take 1,000 mg by mouth once daily.    Historical Provider, MD   DULoxetine (Cymbalta) 60 mg DR capsule Take 1 capsule (60 mg) by mouth once daily.    Historical Provider, MD   lisinopril 5 mg tablet Take 1 tablet (5 mg) by mouth once daily.    Historical Provider, MD   rivaroxaban (Xarelto) 10 mg tablet Take 2.5 mg by mouth 2 times a day.    Historical Provider, MD     RX Allergies[4]  Social History     Tobacco Use    Smoking status: Every Day     Current packs/day: 0.50     Average packs/day: 0.5 packs/day for 30.0 years (15.0 ttl pk-yrs)     Types: Cigarettes    Smokeless tobacco: Never   Substance Use Topics    Alcohol use: Yes     Alcohol/week: 1.0 standard drink of alcohol     Types: 1 Standard drinks or equivalent per week         Chemistry    No results found for: \"NA\", \"K\", \"CL\", \"CO2\", \"BUN\", \"CREATININE\", \"GLU\" No results found for: \"CALCIUM\", \"ALKPHOS\", \"AST\", \"ALT\", \"BILITOT\"       No results found for: \"WBC\", \"HGB\", \"HCT\", \"PLT\"  No results found for: \"PROTIME\", \"PTT\", \"INR\"  No results found for this or any previous visit (from the past 4464 hours).    Relevant Problems   Anesthesia (within normal limits)      Cardiac  METS >4   No cp, SOB, HF sx or other cardiac sx  Echo 2023 normal biV; no significant valvular pathologies    (+) CAD (coronary artery disease)   (+) HTN (hypertension)      Neuro  Residual neuro deficits and speech impediment now recovered   (+) CVA (cerebral vascular accident) (Multi)   (+) Developmental delay      GI  Chronic cholecystitis  \No hearburt, n/v   Tolerating PO at home well      /Renal (within normal limits)      Liver   (+) Chronic cholecystitis      Endocrine   (+) Hypothyroidism      Hematology  ASA/ Xarelto   Last dose Monday 4/28 - xarelto   (+) Anticoagulant long-term use      Musculoskeletal (within normal limits)       Clinical information reviewed:   Tobacco  Allergies  Meds   Med Hx  Surg Hx   Fam Hx  Soc Hx        NPO Detail:  No data recorded "     Physical Exam    Airway  Mallampati: IV  TM distance: >3 FB  Neck ROM: full  Mouth openin finger widths     Cardiovascular    Dental - normal exam     Pulmonary    Abdominal            Anesthesia Plan    History of general anesthesia?: yes  History of complications of general anesthesia?: no    ASA 3     general     intravenous induction   Postoperative pain plan includes opioids.  Trial extubation is planned.  Anesthetic plan and risks discussed with patient.  Use of blood products discussed with patient and legal guardian who consented to blood products.    Plan discussed with resident.             [1]   Past Surgical History:  Procedure Laterality Date    APPENDECTOMY      CAROTID STENT      ERCP W/ PLASTIC STENT PLACEMENT  2025    HERNIA REPAIR      SKIN GRAFT      childhood   [2]   Past Medical History:  Diagnosis Date    Arthritis     Asthma     albuterol once weekly for SOB,    Calculus of common bile duct and gallbladder with chronic cholecystitis     Choledocholithiasis     Chronic anticoagulation     Coronary artery disease     Depression     GERD (gastroesophageal reflux disease)     controlled    Hypercholesterolemia     Hypertension     Hypothyroidism     Migraines     On Trileptal    Occlusion and stenosis of left carotid artery     Peripheral neuropathy     Sleep apnea     not using cpap    Stroke (Multi)     on Xarelto and ASA    TIA (transient ischemic attack)     Vascular dementia    [3]   Current Facility-Administered Medications:     ceFAZolin (Ancef) 2 g in dextrose (iso)  mL, 2 g, intravenous, Once, Karthik Matthews MD  [4] No Known Allergies

## 2025-05-05 NOTE — OP NOTE
Cholecystectomy Robot-Assisted Operative Note     Date: 2025  OR Location: [facility] OR    Name: Erick Martinez, : 1975, Age: 49 y.o., MRN: [id number], Sex: male    Diagnosis  Pre-op Diagnosis      * Chronic cholecystitis [K81.1] Post-op Diagnosis     * Chronic cholecystitis [K81.1]     Procedures  Robot-Assisted Cholecystectomy, ICG cholangiography   95376 - MS LAPAROSCOPY SURG CHOLECYSTECTOMY      Surgeons      * Karthik Matthews - Primary    Resident/Fellow/Other Assistant:  Surgeons and Role:     * Alan Delgadillo MD - Resident - Assisting    Staff:   José Miguelulator: Imani  Circulator: aCmila Robertson Person: Stephanie Robertson Person: Luda  Surgical Assistant: Symone    Anesthesia Staff: Anesthesiologist: Suze Willoughby MD  Anesthesia Resident: Bobby Gallegos MD    Procedure Summary  Anesthesia: General  ASA: III  Estimated Blood Loss: 5mL  Intra-op Medications:   Administrations occurring from 0645 to 0955 on 25:   Medication Name Total Dose   sodium chloride 0.9 % irrigation solution 1,000 mL   bupivacaine PF (Marcaine) 0.5 % (5 mg/mL) injection 20 mL   albuterol 2.5 mg /3 mL (0.083 %) nebulizer solution 2.5 mg 2.5 mg   albuterol 2.5 mg /3 mL (0.083 %) nebulizer solution 2.5 mg 2.5 mg   heparin (porcine) injection 5,000 Units 5,000 Units   acetaminophen (Ofirmev) injection 1,000 mg   ceFAZolin (Ancef) vial 1 g 2 g   dexAMETHasone (Decadron) 4 mg/mL 4 mg   dexmedeTOMIDine 4 mcg/mL in 100 mL NS infusion 43.13 mcg   esmolol 10 mg/mL 40 mg   fentaNYL (Sublimaze) injection 50 mcg/mL 100 mcg   HYDROmorphone (Dilaudid) injection 1 mg/mL 0.2 mg   indocyanine green (IC-Green) injection 25 mg 25 mg   LR bolus Cannot be calculated   lidocaine (Xylocaine) injection 2 % 100 mg   ondansetron 2 mg/mL 4 mg   oxygen (O2) therapy 200 L   propofol (Diprivan) injection 10 mg/mL 170 mg   rocuronium (ZeMuron) 50 mg/5 mL injection 70 mg   sugammadex (Bridion) 200 mg/2 mL injection 400 mg              Anesthesia  Record               Intraprocedure I/O Totals          Intake    Dexmedetomidine 0.00 mL    The total shown is the total volume documented since Anesthesia Start was filed.    LR bolus 1000.00 mL    Total Intake 1000 mL       Output    Urine 60 mL    Total Output 60 mL       Net    Net Volume 940 mL          Specimen:   ID Type Source Tests Collected by Time   1 : GALLBLADDER Tissue GALLBLADDER CHOLECYSTECTOMY SURGICAL PATHOLOGY EXAM Karthik Matthews MD 5/5/2025 0873                 Drains and/or Catheters:   [REMOVED] Urethral Catheter Non-latex 16 Fr. (Removed)       Findings: Contracted gallbladder with findings of chronic cholecystitis and adhesions to the surrounding tissues.  ICG cholangiography identified in a Berendt right posterior duct just posterior to the gallbladder entering the common bile duct just distal to the entry of the cystic duct.    Indications: Erick Martinez is an 49 y.o. male who is having surgery for Chronic cholecystitis [K81.1].  Laparoscopy with attempted cholecystectomy was performed at a different institution.  Intraoperative findings were dense inflammatory changes and difficult anatomy.  Cholangiography showed no injuries and the case was aborted.  Patient was referred to me for complex cholecystectomy.    The patient was seen in the preoperative area. The risks, benefits, complications, treatment options, non-operative alternatives, expected recovery and outcomes were discussed with the patient. The possibilities of reaction to medication, pulmonary aspiration, injury to surrounding structures, bleeding, recurrent infection, the need for additional procedures, failure to diagnose a condition, and creating a complication requiring transfusion or operation were discussed with the patient. The patient concurred with the proposed plan, giving informed consent.  The site of surgery was properly noted/marked if necessary per policy. The patient has been actively warmed in preoperative  area. Preoperative antibiotics have been ordered and given within 1 hours of incision. Venous thrombosis prophylaxis have been ordered including bilateral sequential compression devices and chemical prophylaxis    Procedure Details:     The patient was brought to the operating room and placed on the operating table. General anesthesia was smoothly induced. Abdomen prepped and draped in the usual fashion. Timeout performed.    Optical entry was performed using 5 mm port in the left abdomen.  3 robotic ports were then placed across the abdomen and the initial entry site was changed to a robotic port. The gallbladder could not be seen initially.  The adhesions to the area of the gallbladder fossa were taken down until the gallbladder could be identified.  The fundus was elevated and additional anterior adhesions were taken down.  Some of the medial adhesions at the base of segment 4 were divided with cautery to help with mobilization.  As I continued to work on the lateral aspect, I noted the Rouviere's sulcus and paused.  Drawing an imaginary line between the site and the umbilical fissure, I recognized that the gallbladder was quite contracted and I was nearing the tree hepatis.  ICG cholangiography demonstrated an a variant right posterior duct and I also identified the common bile duct.  Using the suction , I bluntly dissected in the hepatocystic triangle and ultimately created circumferential dissection around the cystic duct through the critical view of safety window.  Anatomy was once again confirmed with ICG cholangiography.  2-0 Vicryl tie was used to ligate the cystic duct distally.  2 additional large clips were placed on the staying side and 1 duct on the specimen side and the cystic duct divided.  The gallbladder was now dissected off the liver bed.  Endo Catch bag was used and the specimen was ultimately retrieved from the left lateral port.  The right upper quadrant was irrigated and hemostasis  assured.  Topical hemostatic was also applied. Bilateral transverses abdominis plane blocks were performed using half percent Marcaine and additional local was given at the port sites. The ports were removed under vision and there was no bleeding. Skin was closed with absorbable sutures. Surgical glue was applied as a dressing.    The patient tolerated the procedure well without any apparent intraoperative complications. All counts were correct.  Given the reoperative nature of the surgery, the contracted gallbladder with distorted anatomy and the meticulous dissection required, this was unusually difficult procedure qualifying for 22 modifier.        Evidence of Infection: No   Complications:  None; patient tolerated the procedure well.    Disposition: PACU - hemodynamically stable.  Condition: stable         Task Performed by RNFA or Surgical Assistant:  Bedside robotic assistance with exchanging of instruments and deploying endoCatch bag. Resident assisted with port placement and closure    Attending Attestation: I was present and scrubbed for the key portions of the procedure.    Karthik Matthews  Phone Number: [phone]

## 2025-05-05 NOTE — ANESTHESIA PROCEDURE NOTES
Airway  Date/Time: 5/5/2025 7:32 AM  Reason: elective    Airway not difficult    Staffing  Performed: resident   Authorized by: Suze Willoughby MD    Performed by: Bobby Gallegos MD  Patient location during procedure: OR    Patient Condition  Indications for airway management: anesthesia  Patient position: sniffing  Planned trial extubation  Sedation level: deep     Final Airway Details   Preoxygenated: yes  Final airway type: endotracheal airway  Successful airway: ETT  Cuffed: yes   Successful intubation technique: direct laryngoscopy  Adjuncts used in placement: intubating stylet  Endotracheal tube insertion site: oral  Blade: Abril  Blade size: #4  ETT size (mm): 7.5  Cormack-Lehane Classification: grade I - full view of glottis  Placement verified by: capnometry   Number of attempts at approach: 1

## 2025-05-05 NOTE — BRIEF OP NOTE
Date: 2025  OR Location: [facility] OR    Name: Erick Martinez, : 1975, Age: 49 y.o., MRN: [id number], Sex: male    Diagnosis  Pre-op Diagnosis      * Chronic cholecystitis [K81.1] Post-op Diagnosis     * Chronic cholecystitis [K81.1]     Procedures  Cholecystectomy Robot-Assisted  [id number] - [place] LAPAROSCOPY SURG CHOLECYSTECTOMY      Surgeons      * Karthik Matthews - Primary    Resident/Fellow/Other Assistant:  Surgeons and Role:     * Alan Delgadillo MD - Resident - Assisting    Staff:   Tamika: Imani  Circulator: Camila Robertson Person: Stephanie Robertson Person: Luda  Surgical Assistant: Symone    Anesthesia Staff: Anesthesiologist: Suze Willoughby MD  Anesthesia Resident: Bobby Gallegos MD    Procedure Summary  Anesthesia: General  ASA: III  Estimated Blood Loss: 5mL  Intra-op Medications:   Administrations occurring from 0645 to 0955 on 25:   Medication Name Total Dose   sodium chloride 0.9 % irrigation solution 1,000 mL   bupivacaine PF (Marcaine) 0.5 % (5 mg/mL) injection 20 mL   acetaminophen (Ofirmev) injection 1,000 mg   ceFAZolin (Ancef) vial 1 g 2 g   dexAMETHasone (Decadron) 4 mg/mL 4 mg   dexmedeTOMIDine 4 mcg/mL in 100 mL NS infusion 74.75 mcg   fentaNYL (Sublimaze) injection 50 mcg/mL 100 mcg   HYDROmorphone (Dilaudid) injection 1 mg/mL 0.2 mg   indocyanine green (IC-Green) injection 25 mg 25 mg   LR bolus Cannot be calculated   lidocaine (Xylocaine) injection 2 % 100 mg   ondansetron 2 mg/mL 4 mg   propofol (Diprivan) injection 10 mg/mL 170 mg   rocuronium (ZeMuron) 50 mg/5 mL injection 70 mg   albuterol 2.5 mg /3 mL (0.083 %) nebulizer solution 2.5 mg 2.5 mg   heparin (porcine) injection 5,000 Units 5,000 Units              Anesthesia Record               Intraprocedure I/O Totals          Intake    Dexmedetomidine 0.00 mL    The total shown is the total volume documented since Anesthesia Start was filed.    LR bolus 1000.00 mL    Total Intake 1000 mL       Output    Urine 60  mL    Total Output 60 mL       Net    Net Volume 940 mL          Specimen:   ID Type Source Tests Collected by Time   1 : GALLBLADDER Tissue GALLBLADDER CHOLECYSTECTOMY SURGICAL PATHOLOGY EXAM Karthik Matthews MD 5/5/2025 0849                  Findings: Chronically inflamed gallbladder (small) with adhesions to omentum, stomach/duodenum; excised in entirety; good visualization with ICG.    Complications:  None; patient tolerated the procedure well.     Disposition: PACU - hemodynamically stable.  Condition: stable  Specimens Collected:   ID Type Source Tests Collected by Time   1 : GALLBLADDER Tissue GALLBLADDER CHOLECYSTECTOMY SURGICAL PATHOLOGY EXAM Karthik Matthews MD 5/5/2025 0849     Alan Delgadillo MD  General Surgery Resident    Attending Attestation: I was present and scrubbed for the entire procedure.    Karthik Matthews  Phone Number: [phone]

## 2025-05-05 NOTE — DISCHARGE INSTRUCTIONS
Please resume your xarelto on 5/6/2025 at noon and then continue per prior prescribed.    Utilize over the counter tylenol/ibuprofen for pain control. Use the prescribed oxycodone for pain not controlled by tylenol/ibuprofen.     Resume normal diet and activity. Limit heavy lifting >20lbs for the next 2 weeks.     Follow up with Dr. Matthews in 2 weeks for post-operative evaluation.    You have 4 incisions on your abdomen covered in surgical glue. Please do not pick at the glue. It will fall off on its own.     You may resume showering tomorrow. Avoid baths or submerging in water for the next 2 weeks.     Please call with questions or concerns.

## 2025-05-13 LAB
LABORATORY COMMENT REPORT: NORMAL
PATH REPORT.FINAL DX SPEC: NORMAL
PATH REPORT.GROSS SPEC: NORMAL
PATH REPORT.RELEVANT HX SPEC: NORMAL
PATH REPORT.TOTAL CANCER: NORMAL

## 2025-05-20 NOTE — PROGRESS NOTES
Subjective     HPI  Erick Martinez is a 49 y.o. male here in postoperative followup after undergoing robotic cholecystectomy on 5/5/2025.    He is having some nausea but he is able to eat what he would like. Some soreness in his abdomen still but no pain. Energy is back to baseline. No fever or chills. No concerns regarding his recovery and he feels that he has recovered well. He is not back to work at [employer] yet. He is a  and lift >10 pounds. He would like to go back to work as soon as possible.       Objective     Vitals:    05/22/25 0946   BP: 114/77   Pulse: 67   Resp: 16   Temp: 36.4 °C (97.5 °F)   SpO2: 95%         NAD  Abdomen soft with well healed incisions. No infection or drainage      Assessment/Plan     Doing well.  Return to work in 4 weeks.  Will contact Dr. Vasquez Capone's office for biliary stent removal.    Karthik Matthews MD

## 2025-05-22 ENCOUNTER — OFFICE VISIT (OUTPATIENT)
Dept: SURGICAL ONCOLOGY | Facility: CLINIC | Age: 50
End: 2025-05-22
Payer: COMMERCIAL

## 2025-05-22 VITALS
BODY MASS INDEX: 40.46 KG/M2 | OXYGEN SATURATION: 95 % | RESPIRATION RATE: 16 BRPM | SYSTOLIC BLOOD PRESSURE: 114 MMHG | DIASTOLIC BLOOD PRESSURE: 77 MMHG | TEMPERATURE: 97.5 F | WEIGHT: 250.66 LBS | HEART RATE: 67 BPM

## 2025-05-22 DIAGNOSIS — K81.1 CHRONIC CHOLECYSTITIS: Primary | ICD-10-CM

## 2025-05-22 PROCEDURE — 3078F DIAST BP <80 MM HG: CPT | Performed by: SURGERY

## 2025-05-22 PROCEDURE — 3074F SYST BP LT 130 MM HG: CPT | Performed by: SURGERY

## 2025-05-22 PROCEDURE — 99211 OFF/OP EST MAY X REQ PHY/QHP: CPT | Performed by: SURGERY

## 2025-05-22 RX ORDER — OMEGA-3-ACID ETHYL ESTERS 1 G/1
1 CAPSULE, LIQUID FILLED ORAL 2 TIMES DAILY
COMMUNITY

## 2025-05-22 ASSESSMENT — PAIN SCALES - GENERAL: PAINLEVEL_OUTOF10: 0-NO PAIN

## (undated) DEVICE — FORCEPS, PROGRASP, DAVINCI XI

## (undated) DEVICE — BRUSH ENDO CLN L90.5IN SHTH DIA1.7MM BRIST DIA5-7MM 2-6MM

## (undated) DEVICE — MANIFOLD, 4 PORT NEPTUNE STANDARD

## (undated) DEVICE — GRASPER,FORCE, BIPOLAR, DAVINCI XI

## (undated) DEVICE — SYRINGE, LUER SLIP, STERILE, 10ML: Brand: MEDLINE

## (undated) DEVICE — ENDOSCOPIC TRAY TRNSPRT 20.5X16.5X4.1 IN RECYCL SUGAR PULP

## (undated) DEVICE — SYSTEM BX CAP BILI RAP EXCHG CAP LOK DEV COMPATIBLE W/ OLY

## (undated) DEVICE — GLOVE ORANGE PI 8   MSG9080

## (undated) DEVICE — SINGLE-USE POLYPECTOMY SNARE: Brand: CAPTIVATOR

## (undated) DEVICE — SUTURE, VICRYL, 3-0, 27 IN, SH

## (undated) DEVICE — SCISSORS, MONOPOLAR, CURVED, 8MM

## (undated) DEVICE — COVER, CART, 45 X 27 X 48 IN, CLEAR

## (undated) DEVICE — CATHETER TRAY, SURESTEP, 16FR, URINE METER W/STATLOCK

## (undated) DEVICE — CONTAINER,SPECIMEN,OR STERILE,4OZ: Brand: MEDLINE

## (undated) DEVICE — KIT, ROBOTIC, CUSTOM UHC

## (undated) DEVICE — Device

## (undated) DEVICE — JELLY,LUBE,STERILE,FLIP TOP,TUBE,2-OZ: Brand: MEDLINE

## (undated) DEVICE — SEAL, UNIVERSAL, 5-12MM

## (undated) DEVICE — TUBE SET 96 MM 64 MM H2O PERISTALTIC STD AUX CHANNEL

## (undated) DEVICE — LABEL MED MINI W/ MARKER

## (undated) DEVICE — DRAPE, COLUMN, DAVINCI XI

## (undated) DEVICE — CONMED SCOPE SAVER BITE BLOCK, 20X27 MM: Brand: SCOPE SAVER

## (undated) DEVICE — TUBING IRRIGATION 140/160/180/190 SER GI ENDOSCP SMARTCAP

## (undated) DEVICE — RESCUE COMBO FORCEPS

## (undated) DEVICE — FORCEPS, BIPOLAR FENESTRATED XI

## (undated) DEVICE — COVER,TABLE,44X90,STERILE: Brand: MEDLINE

## (undated) DEVICE — ADHESIVE, SKIN, DERMABOND ADVANCED, 15CM, PEN-STYLE

## (undated) DEVICE — COVER, TIP HOT SHEARS ENDOWRIST

## (undated) DEVICE — TOWEL,OR,DSP,ST,BLUE,STD,4/PK,20PK/CS: Brand: MEDLINE

## (undated) DEVICE — SPONGE, HEMOSTATIC, CELLULOSE, SURGICEL, NU-KNIT, 3 X 4 IN

## (undated) DEVICE — DRAPE, SHEET, UTILITY, NON ABSORBENT, 18 X 26 IN, LF

## (undated) DEVICE — DRAPE, ARM XI

## (undated) DEVICE — SINGLE PORT MANIFOLD: Brand: NEPTUNE 2

## (undated) DEVICE — ELECTRODE PT RET AD L9FT HI MOIST COND ADH HYDRGEL CORDED

## (undated) DEVICE — TUBING, SUCTION, 1/4" X 10', STRAIGHT: Brand: MEDLINE

## (undated) DEVICE — DRAPE, SHEET, ENDOSCOPY, GENERAL, FENESTRATED, ARMBOARD COVER, 98 X 123.5 IN, DISPOSABLE, LF, STERILE

## (undated) DEVICE — APPLIER, CLIP LARGE XI

## (undated) DEVICE — GOWN,AURORA,NONREINFORCED,LARGE: Brand: MEDLINE

## (undated) DEVICE — SYSTEM, FIOS FIRST ENTRY, 5 X 100MM, KII ADVANCED FIXATION

## (undated) DEVICE — SUTURE, MONOCRYL, 4-0, 18 IN, PS2, UNDYED

## (undated) DEVICE — OBTURATOR, BLADELESS , SU

## (undated) DEVICE — ARM, SUCTION IRRIGATOR, DAVINCI XI

## (undated) DEVICE — SYRINGE MED 50ML LUERLOCK TIP

## (undated) DEVICE — TUBE SET, INSUFFLATION, SMOKE EVAC, DAVINCI

## (undated) DEVICE — ENDO CARRY-ON PROCEDURE KIT: Brand: ENDO CARRY-ON PROCEDURE KIT

## (undated) DEVICE — GUIDEWIRE ENDOSCP L260CM DIA0.035IN BILI STD HI PERF STR

## (undated) DEVICE — RETRIEVAL BALLOON CATHETER: Brand: EXTRACTOR™ PRO RX-S